# Patient Record
Sex: FEMALE | Race: AMERICAN INDIAN OR ALASKA NATIVE | NOT HISPANIC OR LATINO
[De-identification: names, ages, dates, MRNs, and addresses within clinical notes are randomized per-mention and may not be internally consistent; named-entity substitution may affect disease eponyms.]

---

## 2018-09-20 ENCOUNTER — TRANSCRIPTION ENCOUNTER (OUTPATIENT)
Age: 48
End: 2018-09-20

## 2019-01-20 ENCOUNTER — TRANSCRIPTION ENCOUNTER (OUTPATIENT)
Age: 49
End: 2019-01-20

## 2021-10-21 ENCOUNTER — TRANSCRIPTION ENCOUNTER (OUTPATIENT)
Age: 51
End: 2021-10-21

## 2023-01-01 ENCOUNTER — NON-APPOINTMENT (OUTPATIENT)
Age: 53
End: 2023-01-01

## 2023-01-02 ENCOUNTER — EMERGENCY (EMERGENCY)
Facility: HOSPITAL | Age: 53
LOS: 1 days | Discharge: HOME | End: 2023-01-02
Attending: EMERGENCY MEDICINE | Admitting: EMERGENCY MEDICINE
Payer: MEDICAID

## 2023-01-02 VITALS
HEART RATE: 98 BPM | RESPIRATION RATE: 18 BRPM | TEMPERATURE: 98 F | DIASTOLIC BLOOD PRESSURE: 80 MMHG | OXYGEN SATURATION: 97 % | SYSTOLIC BLOOD PRESSURE: 130 MMHG

## 2023-01-02 VITALS
WEIGHT: 179.9 LBS | HEART RATE: 107 BPM | DIASTOLIC BLOOD PRESSURE: 85 MMHG | TEMPERATURE: 98 F | SYSTOLIC BLOOD PRESSURE: 133 MMHG | OXYGEN SATURATION: 97 % | RESPIRATION RATE: 18 BRPM

## 2023-01-02 PROCEDURE — 73630 X-RAY EXAM OF FOOT: CPT

## 2023-01-02 PROCEDURE — 73610 X-RAY EXAM OF ANKLE: CPT | Mod: 26,RT

## 2023-01-02 PROCEDURE — 73630 X-RAY EXAM OF FOOT: CPT | Mod: 26,RT

## 2023-01-02 PROCEDURE — 99284 EMERGENCY DEPT VISIT MOD MDM: CPT | Mod: 25

## 2023-01-02 PROCEDURE — 99284 EMERGENCY DEPT VISIT MOD MDM: CPT

## 2023-01-02 PROCEDURE — 73610 X-RAY EXAM OF ANKLE: CPT

## 2023-01-02 RX ORDER — IBUPROFEN 200 MG
600 TABLET ORAL ONCE
Refills: 0 | Status: COMPLETED | OUTPATIENT
Start: 2023-01-02 | End: 2023-01-02

## 2023-01-02 RX ADMIN — Medication 600 MILLIGRAM(S): at 21:34

## 2023-01-02 NOTE — ED PROVIDER NOTE - SKIN, MLM
Skin normal color for race, warm, dry and intact. No evidence of rash. no swelling or redness to right ankle. no abrasions or lacerations. cap refill < 2 sec

## 2023-01-02 NOTE — ED PROVIDER NOTE - CARE PROVIDER_API CALL
Todd Mathur (DPM)  Foot Surgery; Podiatric Medicine; [facility] Rearfoot[facility]kle Surgery  [address] [place] [address]  Phone: [phone]  Fax: [phone]  Follow Up Time: 1-3 Days    or your podiatrist,   Phone: (   )    -  Fax: (   )    -  Follow Up Time: 1-3 Days

## 2023-01-02 NOTE — ED PROVIDER NOTE - OBJECTIVE STATEMENT
52-year-old female with no past medical history presents to the ED complaining of right foot and ankle pain status post injury this morning.  Patient explains she was walking and twisted her ankle.  No fall to the ground.  Patient explains that for the first 4 hours after the injury she was able to ambulate however now the pain is worse and is unable to bear weight.  No additional injury.  No head trauma.  Denies fever, chills, chest pain, shortness of breath, abdominal pain, nausea, vomiting, lower extremity weakness or paresthesias.

## 2023-01-02 NOTE — ED ADULT NURSE NOTE - OBJECTIVE STATEMENT
Patient A/Ox4 with clear speech. Comes in c/o right ankle pain s/p trip and fall. Denies head strike or LOC. NAD noted.

## 2023-01-02 NOTE — ED PROVIDER NOTE - NS ED ATTENDING STATEMENT MOD
This was a shared visit with the PATO. I reviewed and verified the documentation and independently performed the documented:

## 2023-01-02 NOTE — ED PROVIDER NOTE - CLINICAL SUMMARY MEDICAL DECISION MAKING FREE TEXT BOX
Patient is a 52-year-old female who presents to the emergency room with a chief complaint of right foot/ankle pain.  Patient with no significant past medical history reports that she is on no active medications at this time.  States that earlier this morning she was walking she lost her balance and twisted her right foot.  Did not fall to the ground, did not strike her head, and there is no LOC.  Initially she reports that she was able to walk and bear weight on the foot.  She was seen at urgent care earlier patient reports that she was told her x-rays are normal she was discharged home with no further treatment.  She reports since then she had worsening pain and difficulty weightbearing secondary to the pain.  Since her symptoms worsen she decided to seek treatment in the emergency room.  Denies chest pain shortness of breath abdominal pain.  Denies numbness or tingling to the extremity.  Patient reports no prior injury to the right foot or ankle.   at bedside in agreement with history. On exam she is sitting in a wheelchair does not appear to be in any acute distress.  Right foot ankle: No tenderness to palpation to the medial or lateral malleoli, Achilles reflex intact, there is swelling just distal to the ankle joint on the ulnar aspect of the foot with tenderness to palpation.  Positive radial pulse cap refill less than 2 seconds sensation grossly intact.  No ecchymosis or skin changes noted to the foot no ecchymosis noted to the plantar aspect of the foot.  Bunion is noted to the first metatarsal patient reports history of this.  Concern for possible sprain strain versus fracture.  Patient is presenting to the emergency room with a chief complaint of foot and ankle pain after an inversion injury.  Neurovascularly intact.  Will obtain x-ray imaging x-rays.  Independent interpretation of x-ray: appears to be any nuclear fracture although the chronicity is unclear given the fact the patient denies any previous history of ankle fracture must consider this acute.  Also questionable fracture of the tip of the tibia.  Will place patient in splint, provide crutches for nonweightbearing, and provide pain control with anti-inflammatories.  Patient advised that she will need to follow-up with the podiatrist outpatient.  She last saw one 1 year ago and additional referral be provided if needed.  Stable for discharge home.   at bedside.

## 2023-01-02 NOTE — ED PROVIDER NOTE - MUSCULOSKELETAL MINIMAL EXAM
+ TTP right lateral malleoous with decreased ROM due to pain. + TTP right prox 5th metatarsal. dp pulses equal and intact bilaterally.

## 2023-01-02 NOTE — ED PROVIDER NOTE - PROVIDER TOKENS
PROVIDER:[TOKEN:[[id number]],FOLLOWUP:[1-3 Days]],FREE:[LAST:[or your podiatrist],PHONE:[(   )    -],FAX:[(   )    -],FOLLOWUP:[1-3 Days]]

## 2023-01-02 NOTE — ED PROVIDER NOTE - PATIENT PORTAL LINK FT
You can access the FollowMyHealth Patient Portal offered by [facility] by registering at the following website: [email or web address]. By joining [employer]’s FollowMyHealth portal, you will also be able to view your health information using other applications (apps) compatible with our system.

## 2023-01-03 PROBLEM — Z00.00 ENCOUNTER FOR PREVENTIVE HEALTH EXAMINATION: Status: ACTIVE | Noted: 2023-01-03

## 2023-01-13 ENCOUNTER — APPOINTMENT (OUTPATIENT)
Dept: PODIATRY | Facility: CLINIC | Age: 53
End: 2023-01-13

## 2024-12-11 ENCOUNTER — EMERGENCY (EMERGENCY)
Facility: HOSPITAL | Age: 54
LOS: 1 days | Discharge: HOME | End: 2024-12-11
Attending: EMERGENCY MEDICINE | Admitting: EMERGENCY MEDICINE
Payer: COMMERCIAL

## 2024-12-11 VITALS
OXYGEN SATURATION: 98 % | DIASTOLIC BLOOD PRESSURE: 78 MMHG | HEART RATE: 87 BPM | RESPIRATION RATE: 17 BRPM | TEMPERATURE: 98 F | SYSTOLIC BLOOD PRESSURE: 125 MMHG

## 2024-12-11 VITALS
WEIGHT: 179.9 LBS | TEMPERATURE: 98 F | HEART RATE: 96 BPM | HEIGHT: 65 IN | RESPIRATION RATE: 18 BRPM | SYSTOLIC BLOOD PRESSURE: 130 MMHG | DIASTOLIC BLOOD PRESSURE: 84 MMHG | OXYGEN SATURATION: 97 %

## 2024-12-11 PROBLEM — Z78.9 OTHER SPECIFIED HEALTH STATUS: Chronic | Status: ACTIVE | Noted: 2023-01-02

## 2024-12-11 PROCEDURE — 99284 EMERGENCY DEPT VISIT MOD MDM: CPT

## 2024-12-11 PROCEDURE — 99283 EMERGENCY DEPT VISIT LOW MDM: CPT | Mod: 25

## 2024-12-11 PROCEDURE — 96372 THER/PROPH/DIAG INJ SC/IM: CPT

## 2024-12-11 RX ORDER — INDOMETHACIN 75 MG/1
1 CAPSULE, EXTENDED RELEASE ORAL
Qty: 15 | Refills: 0
Start: 2024-12-11 | End: 2024-12-15

## 2024-12-11 RX ORDER — KETOROLAC TROMETHAMINE 30 MG/ML
60 INJECTION INTRAMUSCULAR; INTRAVENOUS ONCE
Refills: 0 | Status: DISCONTINUED | OUTPATIENT
Start: 2024-12-11 | End: 2024-12-11

## 2024-12-11 RX ORDER — PREDNISONE 20 MG/1
60 TABLET ORAL ONCE
Refills: 0 | Status: COMPLETED | OUTPATIENT
Start: 2024-12-11 | End: 2024-12-11

## 2024-12-11 RX ADMIN — KETOROLAC TROMETHAMINE 60 MILLIGRAM(S): 30 INJECTION INTRAMUSCULAR; INTRAVENOUS at 13:39

## 2024-12-11 RX ADMIN — PREDNISONE 60 MILLIGRAM(S): 20 TABLET ORAL at 13:39

## 2024-12-11 RX ADMIN — KETOROLAC TROMETHAMINE 60 MILLIGRAM(S): 30 INJECTION INTRAMUSCULAR; INTRAVENOUS at 14:09

## 2024-12-11 NOTE — ED PROVIDER NOTE - PATIENT PORTAL LINK FT
You can access the FollowMyHealth Patient Portal offered by [facility] by registering at the following website: [email or web address]. By joining [employer]’s FollowMyHealth portal, you will also be able to view your health information using other applications (apps) compatible with our system.

## 2024-12-11 NOTE — ED PROVIDER NOTE - OBJECTIVE STATEMENT
53yo female with left foot pain, no trauma or fall, for the last few days, no hx of gout, pt is able to bear weight has been taking tylenol with no relief.

## 2024-12-11 NOTE — ED ADULT NURSE NOTE - HISTORY OF COVID-19 VACCINATION
Detail Level: Detailed Quality 226: Preventive Care And Screening: Tobacco Use: Screening And Cessation Intervention: Patient screened for tobacco use and is an ex/non-smoker Vaccine status unknown

## 2024-12-11 NOTE — ED ADULT TRIAGE NOTE - GLASGOW COMA SCALE: EYE OPENING, MLM
Order sent! Thanks! 
Robbin Carreongarrett left a voicemail on 9/27 after we left for the day and said that Kaden needs a new prescription to fill for the marta 3 plus. Patient was wanting this to be done over the weekend but with no one working weekends the message wasn't received until this morning. Please send new order as soon as you are able to or call pt if you have any questions.     Thank you   Lissy Dumas - [facility]    [phone]    
(E4) spontaneous

## 2024-12-11 NOTE — ED ADULT NURSE NOTE - OBJECTIVE STATEMENT
pt comes in for swelling of left plantar instep. pt states there was no injury or abnormal activity. pain started yesterday and was worse this AM. area appears swollen and bruised. positive pedal pulses

## 2024-12-11 NOTE — ED ADULT NURSE NOTE - NSFALLRISKINTERV_ED_ALL_ED
Assistance OOB with selected safe patient handling equipment if applicable/Assistance with ambulation/Communicate fall risk and risk factors to all staff, patient, and family/Monitor gait and stability/Provide visual cue: yellow wristband, yellow gown, etc/Reinforce activity limits and safety measures with patient and family/Call bell, personal items and telephone in reach/Instruct patient to call for assistance before getting out of bed/chair/stretcher/Non-slip footwear applied when patient is off stretcher/[place] to call system/Physically safe environment - no spills, clutter or unnecessary equipment/Purposeful Proactive Rounding/Room/bathroom lighting operational, light cord in reach

## 2024-12-14 ENCOUNTER — EMERGENCY (EMERGENCY)
Facility: HOSPITAL | Age: 54
LOS: 1 days | Discharge: HOME | End: 2024-12-14
Attending: EMERGENCY MEDICINE | Admitting: EMERGENCY MEDICINE
Payer: COMMERCIAL

## 2024-12-14 VITALS
HEIGHT: 65 IN | SYSTOLIC BLOOD PRESSURE: 134 MMHG | OXYGEN SATURATION: 98 % | WEIGHT: 169.98 LBS | DIASTOLIC BLOOD PRESSURE: 81 MMHG | RESPIRATION RATE: 16 BRPM | TEMPERATURE: 98 F | HEART RATE: 92 BPM

## 2024-12-14 VITALS
SYSTOLIC BLOOD PRESSURE: 130 MMHG | RESPIRATION RATE: 18 BRPM | DIASTOLIC BLOOD PRESSURE: 80 MMHG | OXYGEN SATURATION: 98 % | TEMPERATURE: 98 F | HEART RATE: 85 BPM

## 2024-12-14 PROCEDURE — 99283 EMERGENCY DEPT VISIT LOW MDM: CPT

## 2024-12-14 RX ORDER — PREDNISONE 20 MG/1
2 TABLET ORAL
Qty: 10 | Refills: 0
Start: 2024-12-14 | End: 2024-12-18

## 2024-12-14 NOTE — ED PROVIDER NOTE - OBJECTIVE STATEMENT
Patient is a 54-year-old female who presents to the emergency department here at [facility] complaining of left foot pain.  Patient was seen here 2 days ago complaining of atraumatic left foot pain with no history of gout able to weight-bear but pain worsened with weightbearing.  No fever no chills no nausea no vomiting no diarrhea no trauma.  Patient was evaluated at that time was given 1 oral prednisone as well as IM ketorolac 60 mg with some relief of pain but as result of continued discomfort decided to present here today for further evaluation care treatment and management.

## 2024-12-14 NOTE — ED ADULT NURSE NOTE - OBJECTIVE STATEMENT
pt states that she was seen in the ED and was diagnosed with gout. pt reports N/V and a headache for the past three days. pt in no acute distress. pt updated on plan of care.

## 2024-12-14 NOTE — ED ADULT NURSE NOTE - NS ED NURSE RECORD ANOTHER HT AND WT
Detail Level: Zone
Initiate Treatment: Triamcinolone cream AAA BID PRN x 2 weeks, RTC if not clear with topical
Yes

## 2024-12-14 NOTE — ED PROVIDER NOTE - PHYSICAL EXAMINATION
Examination reveals a well-developed well-nourished female in no acute distress with a left foot exam that reveals mild swelling medial aspect left foot near base of first metatarsal area with slight erythema and warmth.  No lymphangitis.  Neurovascular intact right lower extremity.

## 2024-12-14 NOTE — ED ADULT TRIAGE NOTE - CHIEF COMPLAINT QUOTE
54y F  from home to ED triage.. pt c/o worsening pain to medial LT foot (dx with gout in ER past week- taking indomethacin 50mg), and HA with N/V starting this AM

## 2024-12-14 NOTE — ED PROVIDER NOTE - PATIENT PORTAL LINK FT
You can access the FollowMyHealth Patient Portal offered by [facility] by registering at the following website: [email or web address]. By joining [employer]’s FollowMyHealth portal, you will also be able to view your health information using other applications (apps) compatible with our system.

## 2024-12-14 NOTE — ED PROVIDER NOTE - CLINICAL SUMMARY MEDICAL DECISION MAKING FREE TEXT BOX
Left foot pain atraumatic consistent with gouty arthropathy requiring thorough evaluation performed in an independent manner along with medications.

## 2024-12-14 NOTE — ED PROVIDER NOTE - NSFOLLOWUPINSTRUCTIONS_ED_ALL_ED_FT
Rest.  Elevate leg when resting at home.  Continue to take indomethacin.  Take prednisone 40 mg/day for the next 5 days.  Follow-up with your primary care physician this week for reevaluation.  Return here if needed.

## 2024-12-14 NOTE — ED PROVIDER NOTE - DIFFERENTIAL DIAGNOSIS
Differential Diagnosis Musculoskeletal pain versus soft tissue trauma versus gout versus sprain strain doubt doubt septic arthritis.

## 2024-12-14 NOTE — ED PROVIDER NOTE - CARE PROVIDER_API CALL
Daniel Parks.  Rheumatology  [address] [place] [address]  Phone: [phone]  Fax: [phone]  Follow Up Time:

## 2024-12-17 ENCOUNTER — INPATIENT (INPATIENT)
Facility: HOSPITAL | Age: 54
LOS: 9 days | Discharge: HOME | DRG: 854 | End: 2024-12-27
Attending: STUDENT IN AN ORGANIZED HEALTH CARE EDUCATION/TRAINING PROGRAM | Admitting: INTERNAL MEDICINE
Payer: COMMERCIAL

## 2024-12-17 VITALS
TEMPERATURE: 98 F | DIASTOLIC BLOOD PRESSURE: 85 MMHG | SYSTOLIC BLOOD PRESSURE: 148 MMHG | WEIGHT: 177.91 LBS | HEART RATE: 110 BPM | OXYGEN SATURATION: 98 % | HEIGHT: 65 IN | RESPIRATION RATE: 18 BRPM

## 2024-12-17 DIAGNOSIS — L03.116 CELLULITIS OF LEFT LOWER LIMB: ICD-10-CM

## 2024-12-17 LAB
ALBUMIN SERPL ELPH-MCNC: 3.6 G/DL — SIGNIFICANT CHANGE UP (ref 3.3–5)
ALP SERPL-CCNC: 88 U/L — SIGNIFICANT CHANGE UP (ref 40–120)
ALT FLD-CCNC: 36 U/L — SIGNIFICANT CHANGE UP (ref 12–78)
ANION GAP SERPL CALC-SCNC: 7 MMOL/L — SIGNIFICANT CHANGE UP (ref 5–17)
APTT BLD: 32.8 SEC — SIGNIFICANT CHANGE UP (ref 24.5–35.6)
AST SERPL-CCNC: 15 U/L — SIGNIFICANT CHANGE UP (ref 15–37)
BASOPHILS # BLD AUTO: 0.09 K/UL — SIGNIFICANT CHANGE UP (ref 0–0.2)
BASOPHILS NFR BLD AUTO: 0.4 % — SIGNIFICANT CHANGE UP (ref 0–2)
BILIRUB SERPL-MCNC: 0.4 MG/DL — SIGNIFICANT CHANGE UP (ref 0.2–1.2)
BUN SERPL-MCNC: 14 MG/DL — SIGNIFICANT CHANGE UP (ref 7–23)
CALCIUM SERPL-MCNC: 9.8 MG/DL — SIGNIFICANT CHANGE UP (ref 8.5–10.1)
CHLORIDE SERPL-SCNC: 104 MMOL/L — SIGNIFICANT CHANGE UP (ref 96–108)
CO2 SERPL-SCNC: 26 MMOL/L — SIGNIFICANT CHANGE UP (ref 22–31)
CREAT SERPL-MCNC: 0.94 MG/DL — SIGNIFICANT CHANGE UP (ref 0.5–1.3)
EGFR: 73 ML/MIN/1.73M2 — SIGNIFICANT CHANGE UP
EOSINOPHIL # BLD AUTO: 0.01 K/UL — SIGNIFICANT CHANGE UP (ref 0–0.5)
EOSINOPHIL NFR BLD AUTO: 0 % — SIGNIFICANT CHANGE UP (ref 0–6)
ERYTHROCYTE [SEDIMENTATION RATE] IN BLOOD: 53 MM/HR — HIGH (ref 0–20)
GLUCOSE SERPL-MCNC: 140 MG/DL — HIGH (ref 70–99)
HCG SERPL-ACNC: <1 MIU/ML — SIGNIFICANT CHANGE UP
HCT VFR BLD CALC: 39.3 % — SIGNIFICANT CHANGE UP (ref 34.5–45)
HGB BLD-MCNC: 13.2 G/DL — SIGNIFICANT CHANGE UP (ref 11.5–15.5)
IMM GRANULOCYTES NFR BLD AUTO: 0.4 % — SIGNIFICANT CHANGE UP (ref 0–0.9)
INR BLD: 1.06 RATIO — SIGNIFICANT CHANGE UP (ref 0.85–1.16)
LACTATE SERPL-SCNC: 2.6 MMOL/L — HIGH (ref 0.7–2)
LYMPHOCYTES # BLD AUTO: 10.5 % — LOW (ref 13–44)
LYMPHOCYTES # BLD AUTO: 2.13 K/UL — SIGNIFICANT CHANGE UP (ref 1–3.3)
MCHC RBC-ENTMCNC: 29.4 PG — SIGNIFICANT CHANGE UP (ref 27–34)
MCHC RBC-ENTMCNC: 33.6 G/DL — SIGNIFICANT CHANGE UP (ref 32–36)
MCV RBC AUTO: 87.5 FL — SIGNIFICANT CHANGE UP (ref 80–100)
MONOCYTES # BLD AUTO: 1.95 K/UL — HIGH (ref 0–0.9)
MONOCYTES NFR BLD AUTO: 9.6 % — SIGNIFICANT CHANGE UP (ref 2–14)
NEUTROPHILS # BLD AUTO: 15.96 K/UL — HIGH (ref 1.8–7.4)
NEUTROPHILS NFR BLD AUTO: 79.1 % — HIGH (ref 43–77)
NRBC # BLD: 0 /100 WBCS — SIGNIFICANT CHANGE UP (ref 0–0)
PLATELET # BLD AUTO: 338 K/UL — SIGNIFICANT CHANGE UP (ref 150–400)
POTASSIUM SERPL-MCNC: 3.4 MMOL/L — LOW (ref 3.5–5.3)
POTASSIUM SERPL-SCNC: 3.4 MMOL/L — LOW (ref 3.5–5.3)
PROT SERPL-MCNC: 7.8 G/DL — SIGNIFICANT CHANGE UP (ref 6–8.3)
PROTHROM AB SERPL-ACNC: 12.4 SEC — SIGNIFICANT CHANGE UP (ref 9.9–13.4)
RBC # BLD: 4.49 M/UL — SIGNIFICANT CHANGE UP (ref 3.8–5.2)
RBC # FLD: 13.7 % — SIGNIFICANT CHANGE UP (ref 10.3–14.5)
SODIUM SERPL-SCNC: 137 MMOL/L — SIGNIFICANT CHANGE UP (ref 135–145)
WBC # BLD: 20.23 K/UL — HIGH (ref 3.8–10.5)
WBC # FLD AUTO: 20.23 K/UL — HIGH (ref 3.8–10.5)

## 2024-12-17 PROCEDURE — 71045 X-RAY EXAM CHEST 1 VIEW: CPT | Mod: 26

## 2024-12-17 PROCEDURE — 73720 MRI LWR EXTREMITY W/O&W/DYE: CPT | Mod: 26,LT,MC

## 2024-12-17 PROCEDURE — 73630 X-RAY EXAM OF FOOT: CPT | Mod: 26,LT

## 2024-12-17 PROCEDURE — 93010 ELECTROCARDIOGRAM REPORT: CPT

## 2024-12-17 PROCEDURE — 99285 EMERGENCY DEPT VISIT HI MDM: CPT

## 2024-12-17 RX ORDER — KETOROLAC TROMETHAMINE 30 MG/ML
30 INJECTION INTRAMUSCULAR; INTRAVENOUS ONCE
Refills: 0 | Status: DISCONTINUED | OUTPATIENT
Start: 2024-12-17 | End: 2024-12-17

## 2024-12-17 RX ORDER — PIPERACILLIN AND TAZOBACTAM 3; .375 G/15ML; G/15ML
3.38 INJECTION, POWDER, LYOPHILIZED, FOR SOLUTION INTRAVENOUS ONCE
Refills: 0 | Status: COMPLETED | OUTPATIENT
Start: 2024-12-17 | End: 2024-12-17

## 2024-12-17 RX ORDER — SODIUM CHLORIDE 9 MG/ML
1500 INJECTION, SOLUTION INTRAMUSCULAR; INTRAVENOUS; SUBCUTANEOUS ONCE
Refills: 0 | Status: COMPLETED | OUTPATIENT
Start: 2024-12-17 | End: 2024-12-17

## 2024-12-17 RX ORDER — ACETAMINOPHEN 80 MG/.8ML
650 SOLUTION/ DROPS ORAL EVERY 6 HOURS
Refills: 0 | Status: DISCONTINUED | OUTPATIENT
Start: 2024-12-17 | End: 2024-12-17

## 2024-12-17 RX ORDER — ACETAMINOPHEN 80 MG/.8ML
1000 SOLUTION/ DROPS ORAL ONCE
Refills: 0 | Status: COMPLETED | OUTPATIENT
Start: 2024-12-17 | End: 2024-12-17

## 2024-12-17 RX ORDER — SODIUM CHLORIDE 9 MG/ML
2500 INJECTION, SOLUTION INTRAMUSCULAR; INTRAVENOUS; SUBCUTANEOUS ONCE
Refills: 0 | Status: DISCONTINUED | OUTPATIENT
Start: 2024-12-17 | End: 2024-12-17

## 2024-12-17 RX ORDER — ACETAMINOPHEN 80 MG/.8ML
650 SOLUTION/ DROPS ORAL EVERY 6 HOURS
Refills: 0 | Status: DISCONTINUED | OUTPATIENT
Start: 2024-12-17 | End: 2024-12-24

## 2024-12-17 RX ORDER — MORPHINE SULFATE 15 MG
2 TABLET, EXTENDED RELEASE ORAL EVERY 4 HOURS
Refills: 0 | Status: DISCONTINUED | OUTPATIENT
Start: 2024-12-17 | End: 2024-12-22

## 2024-12-17 RX ORDER — VANCOMYCIN HYDROCHLORIDE 5 G/100ML
750 INJECTION, POWDER, LYOPHILIZED, FOR SOLUTION INTRAVENOUS ONCE
Refills: 0 | Status: COMPLETED | OUTPATIENT
Start: 2024-12-17 | End: 2024-12-17

## 2024-12-17 RX ORDER — SODIUM CHLORIDE 9 MG/ML
1000 INJECTION, SOLUTION INTRAMUSCULAR; INTRAVENOUS; SUBCUTANEOUS ONCE
Refills: 0 | Status: COMPLETED | OUTPATIENT
Start: 2024-12-17 | End: 2024-12-17

## 2024-12-17 RX ADMIN — PIPERACILLIN AND TAZOBACTAM 200 GRAM(S): 3; .375 INJECTION, POWDER, LYOPHILIZED, FOR SOLUTION INTRAVENOUS at 17:40

## 2024-12-17 RX ADMIN — KETOROLAC TROMETHAMINE 30 MILLIGRAM(S): 30 INJECTION INTRAMUSCULAR; INTRAVENOUS at 23:48

## 2024-12-17 RX ADMIN — PIPERACILLIN AND TAZOBACTAM 25 GRAM(S): 3; .375 INJECTION, POWDER, LYOPHILIZED, FOR SOLUTION INTRAVENOUS at 23:49

## 2024-12-17 RX ADMIN — ACETAMINOPHEN 400 MILLIGRAM(S): 80 SOLUTION/ DROPS ORAL at 16:23

## 2024-12-17 RX ADMIN — PIPERACILLIN AND TAZOBACTAM 3.38 GRAM(S): 3; .375 INJECTION, POWDER, LYOPHILIZED, FOR SOLUTION INTRAVENOUS at 18:37

## 2024-12-17 RX ADMIN — SODIUM CHLORIDE 1000 MILLILITER(S): 9 INJECTION, SOLUTION INTRAMUSCULAR; INTRAVENOUS; SUBCUTANEOUS at 19:00

## 2024-12-17 RX ADMIN — SODIUM CHLORIDE 1500 MILLILITER(S): 9 INJECTION, SOLUTION INTRAMUSCULAR; INTRAVENOUS; SUBCUTANEOUS at 17:40

## 2024-12-17 RX ADMIN — Medication 2 MILLIGRAM(S): at 21:37

## 2024-12-17 RX ADMIN — ACETAMINOPHEN 1000 MILLIGRAM(S): 80 SOLUTION/ DROPS ORAL at 16:38

## 2024-12-17 RX ADMIN — SODIUM CHLORIDE 1000 MILLILITER(S): 9 INJECTION, SOLUTION INTRAMUSCULAR; INTRAVENOUS; SUBCUTANEOUS at 16:23

## 2024-12-17 RX ADMIN — ACETAMINOPHEN 1000 MILLIGRAM(S): 80 SOLUTION/ DROPS ORAL at 17:00

## 2024-12-17 RX ADMIN — VANCOMYCIN HYDROCHLORIDE 250 MILLIGRAM(S): 5 INJECTION, POWDER, LYOPHILIZED, FOR SOLUTION INTRAVENOUS at 20:00

## 2024-12-17 NOTE — ED PROVIDER NOTE - CLINICAL SUMMARY MEDICAL DECISION MAKING FREE TEXT BOX
54-year-old female with no significant past medical history complains of left foot redness and swelling over the past 1 week.  No known trauma.  Patient was seen initially, and started on prednisone for presumed gout.  However the patient's had persistent pain, was seen by rheumatology and podiatry, found to have an elevated white blood cell count and was sent to the ER for admission.  No acute trauma.  No numbness or tingling.  No focal weakness.  No aggravating or alleviating factors otherwise noted.  No other acute complaints.  Exam: Nontoxic, well-appearing.  Normal respiratory effort.  CTA BL, no W/R/R.  Normal cardiac exam.  Left foot with moderate swelling, mild erythema.  Increased pain with full range of motion of the ankle no crepitus.  Normal distal strength and sensation equal bilaterally.  2+ pulses.  Normal cap refill.  Acute left foot/ankle , redness and swelling, possible infection. will check labs, IV antibiotics, admission

## 2024-12-17 NOTE — ED ADULT TRIAGE NOTE - CHIEF COMPLAINT QUOTE
pt was seen here and dx with gout. pt returns today from hematologist with concerns of infection to left fot. pt reports blood work showed infection ( not sure which values) denies fever.

## 2024-12-17 NOTE — ED PROVIDER NOTE - PROGRESS NOTE DETAILS
Pt seen by podiatrist, Dr. Fatima in ED who recommended admission for IV antibiotics and MRI. Spoke to Dr. ANTONINO Ontiveros, who accepted admission.

## 2024-12-17 NOTE — ED ADULT NURSE NOTE - NSFALLRISKINTERV_ED_ALL_ED
Assistance OOB with selected safe patient handling equipment if applicable/Assistance with ambulation/Communicate fall risk and risk factors to all staff, patient, and family/Monitor gait and stability/Provide visual cue: yellow wristband, yellow gown, etc/Reinforce activity limits and safety measures with patient and family/Call bell, personal items and telephone in reach/Instruct patient to call for assistance before getting out of bed/chair/stretcher/Non-slip footwear applied when patient is off stretcher/[place] to call system/Physically safe environment - no spills, clutter or unnecessary equipment/Purposeful Proactive Rounding/Room/bathroom lighting operational, light cord in reach

## 2024-12-17 NOTE — CONSULT NOTE ADULT - PROBLEM SELECTOR RECOMMENDATION 9
Discussed diagnosis and treatment with patient that patient and her family agrees .   There is concern patient has cellulitis/injection rule out gout, tendinitis  Applied compression dressing on lower left leg   Recs ID consult and IV Abx  WBC 20k, continue to trend  Recs MRI left foot for further investigation  Weight bearing as tolerated on left foot   Recs elevation on left lower extremity   Recs admission for further management.   Medical management as per Primary Team   Pt understands that surgical intervention maybe required at a later date if symptoms not remit.    Thank you for this consult   Discussed with all attendings. Discussed diagnosis and treatment with patient that patient and her family agrees .   There is concern patient has cellulitis/infection rule out gout, tendinitis  Applied compression dressing on lower left leg   Recs ID consult and IV Abx  WBC 20k, continue to trend  Recs MRI left foot for further investigation  Weight bearing as tolerated on left foot   Recs elevation on left lower extremity   Recs admission for further management.   Medical management as per Primary Team   Pt understands that surgical intervention maybe required at a later date if symptoms not remit.    Thank you for this consult   Discussed with all attendings.

## 2024-12-17 NOTE — H&P ADULT - HISTORY OF PRESENT ILLNESS
54-year-old female who presents to the emergency department here at [facility] complaining of left foot pain.  Patient was seen here 3 days ago complaining of atraumatic left foot pain with no history of gout able to weight-bear but pain worsened with weightbearing.  No fever no chills no nausea no vomiting no diarrhea no trauma

## 2024-12-17 NOTE — ED ADULT NURSE REASSESSMENT NOTE - TEMPERATURE IN CELSIUS (DEGREES C)
Date: 7/7/2024  Start Time: 0950  End Time:  1025  Number of Participants: 14    Type of Group: Psychoeducation    Wellness Binder Information  Module Name:  Supporting someone with depression    Patient's Goal:  Patient will be able to ID one or two ways to improve how to support someone with a mental health diagnosis.  Patient will exhibit improved knowledge of what types of support is needed.      Notes:  CTRS discussed ways to improve support with someone with a mental health diagnosis.  Patient was an active participant in discussion and was receptive to the information provided.  Patient was accepting of handout.      Status After Intervention:  Improved    Participation Level: Active Listener and Interactive    Participation Quality: Appropriate and Attentive      Speech:  normal      Thought Process/Content: Logical      Affective Functioning: Congruent      Mood: anxious      Level of consciousness:  Alert and Attentive      Response to Learning: Able to verbalize current knowledge/experience, Able to verbalize/acknowledge new learning, and Progressing to goal      Endings: None Reported    Modes of Intervention: Education, Support, Exploration, and Clarifying      Discipline Responsible: Recreational Therapist      Signature:  MIGUEL ANGEL Harding     37.1

## 2024-12-17 NOTE — ED PROVIDER NOTE - OBJECTIVE STATEMENT
54-year-old female with no past medical history presents with complaint of foot pain/redness x 1 week and elevated white blood cell count.  Patient states that she was seen in ED on 12/11 and 12/14 for atraumatic left foot pain, was told to have gout and was initially treated with 1 dose of prednisone, IM pain medication in ED and discharged home on indomethacin, seen in the ED again 3 days later and was given 3 days of prednisone without improvement.  States that she was seen by rheumatologist, Dr. Franks yesterday and had outpatient blood work and was told to have WBC of 21, given colchicine without improvement and advised to come to ER for IV antibiotics.  Patient denies trauma, numbness, tingling, fever, chills, nausea/vomiting, calf pain.

## 2024-12-17 NOTE — H&P ADULT - NSHPPHYSICALEXAM_GEN_ALL_CORE
General: WN/WD NAD  PERRLA  Neurology: A&Ox3, nonfocal, GARRETT x 4  Respiratory: CTA B/L  CV: RRR, S1S2, no murmurs, rubs or gallops  Abdominal: Soft, NT, ND +BS, Last BM  Extremities: edema and redness Left foot

## 2024-12-17 NOTE — H&P ADULT - ASSESSMENT
54-year-old female who presents to the emergency department here at [facility] complaining of left foot pain.  Patient was seen here 3 days ago complaining of atraumatic left foot pain with no history of gout able to weight-bear but pain worsened with weightbearing.  No fever no chills no nausea no vomiting no diarrhea no trauma 54-year-old female who presents to the emergency department here at [facility] complaining of left foot pain.  Patient was seen here 3 days ago complaining of atraumatic left foot pain with no history of gout able to weight-bear but pain worsened with weightbearing.  No fever no chills no nausea no vomiting no diarrhea no trauma      Left foot pain  -  likely cellulitis vs OM  - cw abx  - podiatry fu   - check MRI  - pain control  - fu cultures  - will monitor       Lovenox for DVT prophylaxis

## 2024-12-17 NOTE — ED PROVIDER NOTE - MUSCULOSKELETAL, MLM
+ttp with erythema, swelling and increased warmth noted to medial aspect of proximal left foot/ankle, skin intact, no streaking noted, distal pulses and sensation intact, nl cap refill, NVI

## 2024-12-17 NOTE — H&P ADULT - NSHPLABSRESULTS_GEN_ALL_CORE
Lab Results:  CBC  CBC Full  -  ( 17 Dec 2024 16:10 )  WBC Count : 20.23 K/uL  RBC Count : 4.49 M/uL  Hemoglobin : 13.2 g/dL  Hematocrit : 39.3 %  Platelet Count - Automated : 338 K/uL  Mean Cell Volume : 87.5 fl  Mean Cell Hemoglobin : 29.4 pg  Mean Cell Hemoglobin Concentration : 33.6 g/dL  Auto Neutrophil # : 15.96 K/uL  Auto Lymphocyte # : 2.13 K/uL  Auto Monocyte # : 1.95 K/uL  Auto Eosinophil # : 0.01 K/uL  Auto Basophil # : 0.09 K/uL  Auto Neutrophil % : 79.1 %  Auto Lymphocyte % : 10.5 %  Auto Monocyte % : 9.6 %  Auto Eosinophil % : 0.0 %  Auto Basophil % : 0.4 %    .		Differential:	[] Automated		[] Manual  Chemistry                        13.2   20.23 )-----------( 338      ( 17 Dec 2024 16:10 )             39.3     12-17    137  |  104  |  14  ----------------------------<  140[H]  3.4[L]   |  26  |  0.94    Ca    9.8      17 Dec 2024 16:10    TPro  7.8  /  Alb  3.6  /  TBili  0.4  /  DBili  x   /  AST  15  /  ALT  36  /  AlkPhos  88  12-17    LIVER FUNCTIONS - ( 17 Dec 2024 16:10 )  Alb: 3.6 g/dL / Pro: 7.8 g/dL / ALK PHOS: 88 U/L / ALT: 36 U/L / AST: 15 U/L / GGT: x           PT/INR - ( 17 Dec 2024 16:10 )   PT: 12.4 sec;   INR: 1.06 ratio         PTT - ( 17 Dec 2024 16:10 )  PTT:32.8 sec  Urinalysis Basic - ( 17 Dec 2024 16:10 )    Color: x / Appearance: x / SG: x / pH: x  Gluc: 140 mg/dL / Ketone: x  / Bili: x / Urobili: x   Blood: x / Protein: x / Nitrite: x   Leuk Esterase: x / RBC: x / WBC x   Sq Epi: x / Non Sq Epi: x / Bacteria: x            MICROBIOLOGY/CULTURES:      RADIOLOGY RESULTS: reviewed

## 2024-12-17 NOTE — ED ADULT NURSE NOTE - OBJECTIVE STATEMENT
Pt. received alert and oriented x4 with chief complaint of left foot/ankle pain for last week. Pt. presents w/ edema to affected area. Pt. denies any fever/body aches or chills.

## 2024-12-18 LAB
ANION GAP SERPL CALC-SCNC: 8 MMOL/L — SIGNIFICANT CHANGE UP (ref 5–17)
BUN SERPL-MCNC: 9 MG/DL — SIGNIFICANT CHANGE UP (ref 7–23)
CALCIUM SERPL-MCNC: 9.2 MG/DL — SIGNIFICANT CHANGE UP (ref 8.5–10.1)
CHLORIDE SERPL-SCNC: 104 MMOL/L — SIGNIFICANT CHANGE UP (ref 96–108)
CO2 SERPL-SCNC: 25 MMOL/L — SIGNIFICANT CHANGE UP (ref 22–31)
CREAT SERPL-MCNC: 0.63 MG/DL — SIGNIFICANT CHANGE UP (ref 0.5–1.3)
CRP SERPL-MCNC: 116 MG/L — HIGH
EGFR: 107 ML/MIN/1.73M2 — SIGNIFICANT CHANGE UP
GLUCOSE SERPL-MCNC: 127 MG/DL — HIGH (ref 70–99)
GRAM STN FLD: ABNORMAL
HCT VFR BLD CALC: 36.3 % — SIGNIFICANT CHANGE UP (ref 34.5–45)
HGB BLD-MCNC: 12.1 G/DL — SIGNIFICANT CHANGE UP (ref 11.5–15.5)
LACTATE SERPL-SCNC: 1.3 MMOL/L — SIGNIFICANT CHANGE UP (ref 0.7–2)
LACTATE SERPL-SCNC: 2.3 MMOL/L — HIGH (ref 0.7–2)
MCHC RBC-ENTMCNC: 28.8 PG — SIGNIFICANT CHANGE UP (ref 27–34)
MCHC RBC-ENTMCNC: 33.3 G/DL — SIGNIFICANT CHANGE UP (ref 32–36)
MCV RBC AUTO: 86.4 FL — SIGNIFICANT CHANGE UP (ref 80–100)
METHOD TYPE: SIGNIFICANT CHANGE UP
MSSA DNA SPEC QL NAA+PROBE: SIGNIFICANT CHANGE UP
NRBC # BLD: 0 /100 WBCS — SIGNIFICANT CHANGE UP (ref 0–0)
PLATELET # BLD AUTO: 298 K/UL — SIGNIFICANT CHANGE UP (ref 150–400)
POTASSIUM SERPL-MCNC: 3.6 MMOL/L — SIGNIFICANT CHANGE UP (ref 3.5–5.3)
POTASSIUM SERPL-SCNC: 3.6 MMOL/L — SIGNIFICANT CHANGE UP (ref 3.5–5.3)
RBC # BLD: 4.2 M/UL — SIGNIFICANT CHANGE UP (ref 3.8–5.2)
RBC # FLD: 13.5 % — SIGNIFICANT CHANGE UP (ref 10.3–14.5)
SODIUM SERPL-SCNC: 137 MMOL/L — SIGNIFICANT CHANGE UP (ref 135–145)
SPECIMEN SOURCE: SIGNIFICANT CHANGE UP
SPECIMEN SOURCE: SIGNIFICANT CHANGE UP
URATE SERPL-MCNC: 6.1 MG/DL — SIGNIFICANT CHANGE UP (ref 2.5–7)
WBC # BLD: 19.75 K/UL — HIGH (ref 3.8–10.5)
WBC # FLD AUTO: 19.75 K/UL — HIGH (ref 3.8–10.5)

## 2024-12-18 RX ORDER — INFLUENZA A VIRUS A/WISCONSIN/588/2019 (H1N1) RECOMBINANT HEMAGGLUTININ ANTIGEN, INFLUENZA A VIRUS A/DARWIN/6/2021 (H3N2) RECOMBINANT HEMAGGLUTININ ANTIGEN, INFLUENZA B VIRUS B/AUSTRIA/1359417/2021 RECOMBINANT HEMAGGLUTININ ANTIGEN, AND INFLUENZA B VIRUS B/PHUKET/3073/2013 RECOMBINANT HEMAGGLUTININ ANTIGEN 45; 45; 45; 45 UG/.5ML; UG/.5ML; UG/.5ML; UG/.5ML
0.5 INJECTION INTRAMUSCULAR ONCE
Refills: 0 | Status: DISCONTINUED | OUTPATIENT
Start: 2024-12-18 | End: 2024-12-27

## 2024-12-18 RX ORDER — ACETAMINOPHEN 80 MG/.8ML
1000 SOLUTION/ DROPS ORAL ONCE
Refills: 0 | Status: COMPLETED | OUTPATIENT
Start: 2024-12-18 | End: 2024-12-18

## 2024-12-18 RX ORDER — PIPERACILLIN AND TAZOBACTAM 3; .375 G/15ML; G/15ML
3.38 INJECTION, POWDER, LYOPHILIZED, FOR SOLUTION INTRAVENOUS EVERY 8 HOURS
Refills: 0 | Status: DISCONTINUED | OUTPATIENT
Start: 2024-12-18 | End: 2024-12-18

## 2024-12-18 RX ORDER — PIPERACILLIN AND TAZOBACTAM 3; .375 G/15ML; G/15ML
3.38 INJECTION, POWDER, LYOPHILIZED, FOR SOLUTION INTRAVENOUS ONCE
Refills: 0 | Status: COMPLETED | OUTPATIENT
Start: 2024-12-18 | End: 2024-12-18

## 2024-12-18 RX ORDER — VANCOMYCIN HYDROCHLORIDE 5 G/100ML
1250 INJECTION, POWDER, LYOPHILIZED, FOR SOLUTION INTRAVENOUS ONCE
Refills: 0 | Status: COMPLETED | OUTPATIENT
Start: 2024-12-18 | End: 2024-12-18

## 2024-12-18 RX ORDER — SODIUM CHLORIDE 9 MG/ML
1000 INJECTION, SOLUTION INTRAMUSCULAR; INTRAVENOUS; SUBCUTANEOUS
Refills: 0 | Status: DISCONTINUED | OUTPATIENT
Start: 2024-12-18 | End: 2024-12-27

## 2024-12-18 RX ORDER — PIPERACILLIN AND TAZOBACTAM 3; .375 G/15ML; G/15ML
3.38 INJECTION, POWDER, LYOPHILIZED, FOR SOLUTION INTRAVENOUS ONCE
Refills: 0 | Status: DISCONTINUED | OUTPATIENT
Start: 2024-12-18 | End: 2024-12-18

## 2024-12-18 RX ORDER — VANCOMYCIN HYDROCHLORIDE 5 G/100ML
1200 INJECTION, POWDER, LYOPHILIZED, FOR SOLUTION INTRAVENOUS ONCE
Refills: 0 | Status: DISCONTINUED | OUTPATIENT
Start: 2024-12-18 | End: 2024-12-18

## 2024-12-18 RX ADMIN — VANCOMYCIN HYDROCHLORIDE 166.67 MILLIGRAM(S): 5 INJECTION, POWDER, LYOPHILIZED, FOR SOLUTION INTRAVENOUS at 22:25

## 2024-12-18 RX ADMIN — ACETAMINOPHEN 650 MILLIGRAM(S): 80 SOLUTION/ DROPS ORAL at 11:26

## 2024-12-18 RX ADMIN — ACETAMINOPHEN 650 MILLIGRAM(S): 80 SOLUTION/ DROPS ORAL at 18:08

## 2024-12-18 RX ADMIN — PIPERACILLIN AND TAZOBACTAM 200 GRAM(S): 3; .375 INJECTION, POWDER, LYOPHILIZED, FOR SOLUTION INTRAVENOUS at 09:39

## 2024-12-18 RX ADMIN — Medication 100 MILLIGRAM(S): at 11:21

## 2024-12-18 RX ADMIN — ACETAMINOPHEN 400 MILLIGRAM(S): 80 SOLUTION/ DROPS ORAL at 05:38

## 2024-12-18 RX ADMIN — SODIUM CHLORIDE 500 MILLILITER(S): 9 INJECTION, SOLUTION INTRAMUSCULAR; INTRAVENOUS; SUBCUTANEOUS at 14:38

## 2024-12-18 RX ADMIN — Medication 100 MILLIGRAM(S): at 21:38

## 2024-12-18 NOTE — CARE COORDINATION ASSESSMENT. - NSCAREPROVIDERS_GEN_ALL_CORE_FT
CARE PROVIDERS:  Accepting Physician: Luis Ontiveros  Administration: Kalyan Balderas  Administration: Humberto Lott  Admitting: Luis Ontiveros  Attending: Luis Ontiveros  Consultant: Goyo Reinoso  Consultant: Herbert Fatima  Consultant: Joel Richardson  Consultant: Rui Lazo  Consultant: Wenceslao Gil  Covering Team: Dale Hernandes  ED ACP: Arleen Galvan  ED Attending: Momo Gonzalez  ED Nurse: Humberto Farrell  Emergency Medicine: Arleen Galvan  Nurse: Cristy Quinteros  Nurse: Kimberley Glaser  Nurse: Marjorie Jean Baptiste  Ordered: ServiceAccount, SCMMLM  Ordered: ServiceAccount, SCMMLM  Override: Misti Lucero  PCA/Nursing Assistant: Alysha Stinson  Primary Team: Mariana Stanley  Primary Team: Luis Ontiveros  Registered Dietitian: Geneva Burden

## 2024-12-18 NOTE — CONSULT NOTE ADULT - SUBJECTIVE AND OBJECTIVE BOX
Patient is a 54y old  Female who presents with a chief complaint of left foot pain    HPI:   Patient is a 54-year-old female who presents to the emergency department here at [facility] complaining of left foot pain.  Patient was seen here 3 days ago complaining of atraumatic left foot pain with no history of gout able to weight-bear but pain worsened with weightbearing.  No fever no chills no nausea no vomiting no diarrhea no trauma    PAST MEDICAL & SURGICAL HISTORY:  No pertinent past medical history    No significant past surgical history        MEDICATIONS  (STANDING):  piperacillin/tazobactam IVPB... 3.375 Gram(s) IV Intermittent once    MEDICATIONS  (PRN):      Allergies    No Known Allergies    Intolerances        VITALS:    Vital Signs Last 24 Hrs  T(C): 37.1 (17 Dec 2024 14:58), Max: 37.1 (17 Dec 2024 14:58)  T(F): 98.7 (17 Dec 2024 14:58), Max: 98.7 (17 Dec 2024 14:58)  HR: 95 (17 Dec 2024 14:58) (95 - 110)  BP: 131/86 (17 Dec 2024 14:58) (131/86 - 148/85)  BP(mean): --  RR: 18 (17 Dec 2024 14:58) (18 - 18)  SpO2: 98% (17 Dec 2024 14:58) (98% - 98%)    Parameters below as of 17 Dec 2024 14:58  Patient On (Oxygen Delivery Method): room air        LABS:                          13.2   20.23 )-----------( 338      ( 17 Dec 2024 16:10 )             39.3       12-17    137  |  104  |  14  ----------------------------<  140[H]  3.4[L]   |  26  |  0.94    Ca    9.8      17 Dec 2024 16:10    TPro  7.8  /  Alb  3.6  /  TBili  0.4  /  DBili  x   /  AST  15  /  ALT  36  /  AlkPhos  88  12-17        PT/INR - ( 17 Dec 2024 16:10 )   PT: 12.4 sec;   INR: 1.06 ratio         PTT - ( 17 Dec 2024 16:10 )  PTT:32.8 sec    LOWER EXTREMITY PHYSICAL EXAM:    General: NAD & AOX3.     Integument:  Skin warm, dry and supple bilateral.     Pain on palpation to the medial aspect of left foot. Redness and warmth noticed around the course of Posterior tibial tendon. These findings clinically  consistent with cellulitis rule out septic joint/gout arthritis.   No open wound/ulceration found bilaterally   Vascular: Dorsalis Pedis and Posterior Tibial pulses 2/4.  Capillary re-fill time less then 3 seconds digits 1-5 bilateral.  Mild-moderate edema on left foot   Neuro: Protective sensation intact/diminished to the level of the digits bilateral.   MSK: Muscle strength 5/5 all major muscle groups bilateral.     RADIOLOGY & ADDITIONAL STUDIES:  Xrays left foot taken and reviewed : No acute pathology noted  
[facility] DIV[facility] of INFECTIOUS DISEASE  Goyo Reinoso MD PhD, Laurie Guerrero MD, Tahira Mills MD, Mary Barreto MD, Ronn Ann MD  and providing coverage with Roger Anderson MD  Providing Infectious Disease Consultations at [facility], [facility]'s    Office# [phone] to schedule follow up appointments  Answering Service for urgent calls or New Consults [phone]  Cell# to text for urgent issues Goyo Reinoso [phone]     HPI:  54-year-old female who presents to the emergency department here at [facility] complaining of left foot and ankle pain.  Patient was seen here 3 days PTA complaining of atraumatic left foot pain with no history of gout able to weight-bear but pain worsened with weightbearing.      PAST MEDICAL & SURGICAL HISTORY:  No pertinent past medical history      No significant past surgical history          Antimicrobials  piperacillin/tazobactam IVPB.- 3.375 Gram(s) IV Intermittent once  piperacillin/tazobactam IVPB.. 3.375 Gram(s) IV Intermittent every 8 hours      Immunological  influenza   Vaccine 0.5 milliLiter(s) IntraMuscular once      Other  acetaminophen     Tablet .. 650 milliGRAM(s) Oral every 6 hours PRN  morphine  - Injectable 2 milliGRAM(s) IV Push every 4 hours PRN  oxycodone    5 mG/acetaminophen 325 mG 1 Tablet(s) Oral every 6 hours PRN      Allergies    No Known Allergies    Intolerances        SOCIAL HISTORY:  Social History:  neg (17 Dec 2024 18:17)      FAMILY HISTORY: noncontrib      ROS:    EYES:  Negative  blurry vision or double vision  GASTROINTESTINAL:  Negative for nausea, vomiting, diarrhea  -otherwise negative except for subjective    Vital Signs Last 24 Hrs  T(C): 37.6 (18 Dec 2024 07:00), Max: 39.8 (18 Dec 2024 04:52)  T(F): 99.6 (18 Dec 2024 07:00), Max: 103.6 (18 Dec 2024 04:52)  HR: 129 (18 Dec 2024 04:52) (95 - 129)  BP: 128/80 (18 Dec 2024 04:52) (123/81 - 148/85)  BP(mean): --  RR: 18 (18 Dec 2024 04:52) (17 - 18)  SpO2: 93% (18 Dec 2024 04:52) (93% - 98%)    Parameters below as of 18 Dec 2024 04:52  Patient On (Oxygen Delivery Method): room air        PE:  In no distress  HEENT:  NC, PERRL, sclerae anicteric, conjunctivae clear, EOMI.  Sinuses nontender, no nasal exudate.  No buccal or pharyngeal lesions, erythema or exudate  Neck:  Supple, no adenopathy  Lungs:  No accessory muscle use, bilaterally clear to auscultation  Cor:  distant  Abd:  Symmetric, normoactive BS.  Soft, nontender, no masses, guarding or rebound.  Liver and spleen not enlarged  Extrem:  No cyanosis, left lower extremity with swelling and warmth localized around the ankle with sig pain on both active and passive motion  Neuro: grossly intact  Musc: moving all limbs freely, no focal deficits        LABS:                        12.1   19.75 )-----------( 298      ( 18 Dec 2024 08:37 )             36.3       WBC Count: 19.75 K/uL (12-18-24 @ 08:37)  WBC Count: 20.23 K/uL (12-17-24 @ 16:10)      12-18    137  |  104  |  9   ----------------------------<  127[H]  3.6   |  25  |  0.63    Ca    9.2      18 Dec 2024 08:37    TPro  7.8  /  Alb  3.6  /  TBili  0.4  /  DBili  x   /  AST  15  /  ALT  36  /  AlkPhos  88  12-17      Creatinine: 0.63 mg/dL (12-18-24 @ 08:37)  Creatinine: 0.94 mg/dL (12-17-24 @ 16:10)      Urinalysis Basic - ( 18 Dec 2024 08:37 )    Color: x / Appearance: x / SG: x / pH: x  Gluc: 127 mg/dL / Ketone: x  / Bili: x / Urobili: x   Blood: x / Protein: x / Nitrite: x   Leuk Esterase: x / RBC: x / WBC x   Sq Epi: x / Non Sq Epi: x / Bacteria: x              MICROBIOLOGY:      RADIOLOGY & ADDITIONAL STUDIES:    --< from: MR Foot w/wo IV Cont, Left (12.17.24 @ 19:10) >    ACC: [id number] EXAM:  MR FOOT WAW IC LT   ORDERED BY: IRON OLIVAS     PROCEDURE DATE:  12/17/2024          INTERPRETATION:  Clinical Information: Left foot, rule out infection.    Comparison: Left ankle radiographs from 12/17/2024.    Technique:  MRI of the left ankle and left hindfoot and midfoot. 1 coronal   postcontrast sequence extends into the forefoot.  Intravenous Contrast: 8 cc of Gadavist contrast were administered in 2 cc   were discarded.    Findings:    There is rounded signal abnormality within the distal fibula and adjacent   talus with associated enhancement. There is and ankle joint effusion with   fluid centered at the anterolateral gutter. This could be related to   septic arthritis or degenerative arthrosis.    A calcaneocuboid effusion is also noted. There is also small fluid most   prominent at the fifth tarsometatarsal joint. These findings could be   infectious or reactive in etiology. There is a type II os navicular with   nonspecific marrow signal within the os navicularis and adjacent   navicular which could be related to os navicularis syndrome or   osteomyelitis. There is tenosynovitis of the posterior tibialis, flexor   digitorum and flexor hallucis longus tendons which could be infectious or   reactive in etiology. There is soft tissue edema and enhancement around   the hindfoot and midfoot which extends into the musculature and is   consistent with soft tissue infection. There is nonenhancement between   the os navicularis and medial talus which could be related to devitalized   or ischemic soft tissues. There is subcutaneous edema greatest medially   along the distal tibia and medial foot. There is mild intramuscular edema   within posterior tibialis and myofascial edema at the long flexor tendons   and around the interosseous ligament.    Foci of enhancement within the first metatarsal head are not well   evaluated on this hindfoot and midfoot study but could be related to   arthrosis and less likely septic arthritis.    Impression:  Soft tissue infection at the ankle and foot as above with devitalized or   ischemic soft tissue most prominent between the os navicularis and medial   talus.  Signal abnormality within the os naviculare and adjacent navicular which   could be related to osseous naviculare syndrome or osteomyelitis.  Ankle joint effusion most prominent at the anterolateral gutter with   signal abnormality within the distal fibula and adjacent talus which   could be related to septic arthritis or degenerative change.

## 2024-12-18 NOTE — CHART NOTE - NSCHARTNOTEFT_GEN_A_CORE
Called by RN for + blood cultures. Admission cx with [facility] in clusters 4/4 bottles. Currently on Cefepime for suspected OM vs cellulitis. Received one dose of Vanc on 12/17.   - MRSA PCR pending. Cannot r/o at this time  - Repeat blood cx  - Given Vancomycin x1 for MRSA coverage   - ID following

## 2024-12-18 NOTE — PATIENT PROFILE ADULT - FALL HARM RISK - HARM RISK INTERVENTIONS
Assistance with ambulation/Assistance OOB with selected safe patient handling equipment/Communicate Risk of Fall with Harm to all staff/Discuss with provider need for PT consult/Monitor gait and stability/Reinforce activity limits and safety measures with patient and family/Tailored Fall Risk Interventions/Visual Cue: Yellow wristband and red socks/Bed in lowest position, wheels locked, appropriate side rails in place/Call bell, personal items and telephone in reach/Instruct patient to call for assistance before getting out of bed or chair/Non-slip footwear when patient is out of bed/[place] to call system/Physically safe environment - no spills, clutter or unnecessary equipment/Purposeful Proactive Rounding/Room/bathroom lighting operational, light cord in reach

## 2024-12-18 NOTE — PROGRESS NOTE ADULT - ASSESSMENT
54-year-old female who presents to the emergency department here at [facility] complaining of left foot pain.  Patient was seen here 3 days ago complaining of atraumatic left foot pain with no history of gout able to weight-bear but pain worsened with weightbearing.  No fever no chills no nausea no vomiting no diarrhea no trauma      sepsis sec to Left foot infection with lactic acidosis POA   -  likely cellulitis vs OM  - cw abx as per ID   - podiatry fu   -  MRI reviewed  - check indium scan   - pain control  - fu cultures  - will monitor       Lovenox for DVT prophylaxis

## 2024-12-18 NOTE — CARE COORDINATION ASSESSMENT. - OTHER PERTINENT REFERRAL INFORMATION
CM met with patient and spouse Mae at bedside to explain role and transitions of care. Patient lives with spouse in a private house with 3 steps to get in and 12 to the second floor.  Patient was fully independent prior to admission.  No caregiver identified or home care.  Patient use crutches for ambulation.  No other DMEs.   Spouse will transport patient home when ready to be discharge.  No needs identified.  CM explained  home care expectation, process, insurance provision and home safety with good understanding. Patient and spouse verbalized understanding of plans after discharge and are in agreement.  Resource folder left at bedside.  All questions answered to the best of my abilities.  CM remains available throughout the hospital stay.

## 2024-12-18 NOTE — PROGRESS NOTE ADULT - SUBJECTIVE AND OBJECTIVE BOX
Patient is a 54y old  Female who presents with a chief complaint of Foot pain (18 Dec 2024 09:57)    Date of servie : 12-18-24 @ 13:50  INTERVAL HPI/OVERNIGHT EVENTS:  T(C): 36.6 (12-18-24 @ 11:16), Max: 39.8 (12-18-24 @ 04:52)  HR: 90 (12-18-24 @ 11:16) (90 - 129)  BP: 127/78 (12-18-24 @ 11:16) (123/81 - 131/86)  RR: 18 (12-18-24 @ 11:16) (17 - 18)  SpO2: 96% (12-18-24 @ 11:16) (93% - 98%)  Wt(kg): --  I&O's Summary      LABS:                        12.1   19.75 )-----------( 298      ( 18 Dec 2024 08:37 )             36.3     12-18    137  |  104  |  9   ----------------------------<  127[H]  3.6   |  25  |  0.63    Ca    9.2      18 Dec 2024 08:37    TPro  7.8  /  Alb  3.6  /  TBili  0.4  /  DBili  x   /  AST  15  /  ALT  36  /  AlkPhos  88  12-17    PT/INR - ( 17 Dec 2024 16:10 )   PT: 12.4 sec;   INR: 1.06 ratio         PTT - ( 17 Dec 2024 16:10 )  PTT:32.8 sec  Urinalysis Basic - ( 18 Dec 2024 08:37 )    Color: x / Appearance: x / SG: x / pH: x  Gluc: 127 mg/dL / Ketone: x  / Bili: x / Urobili: x   Blood: x / Protein: x / Nitrite: x   Leuk Esterase: x / RBC: x / WBC x   Sq Epi: x / Non Sq Epi: x / Bacteria: x      CAPILLARY BLOOD GLUCOSE            Urinalysis Basic - ( 18 Dec 2024 08:37 )    Color: x / Appearance: x / SG: x / pH: x  Gluc: 127 mg/dL / Ketone: x  / Bili: x / Urobili: x   Blood: x / Protein: x / Nitrite: x   Leuk Esterase: x / RBC: x / WBC x   Sq Epi: x / Non Sq Epi: x / Bacteria: x        MEDICATIONS  (STANDING):  cefepime   IVPB 2000 milliGRAM(s) IV Intermittent every 12 hours  influenza   Vaccine 0.5 milliLiter(s) IntraMuscular once    MEDICATIONS  (PRN):  acetaminophen     Tablet .. 650 milliGRAM(s) Oral every 6 hours PRN Temp greater or equal to 38C (100.4F), Mild Pain (1 - 3)  morphine  - Injectable 2 milliGRAM(s) IV Push every 4 hours PRN Severe Pain (7 - 10)  oxycodone    5 mG/acetaminophen 325 mG 1 Tablet(s) Oral every 6 hours PRN Moderate Pain (4 - 6)          PHYSICAL EXAM:  GENERAL: NAD, well-groomed, well-developed  HEAD:  Atraumatic, Normocephalic  CHEST/LUNG: Clear to percussion bilaterally; No rales, rhonchi, wheezing, or rubs  HEART: Regular rate and rhythm; No murmurs, rubs, or gallops  ABDOMEN: Soft, Nontender, Nondistended; Bowel sounds present  EXTREMITIES: Left foot swelling +    Care Discussed with Consultants/Other Providers [ ] YES  [ ] NO

## 2024-12-18 NOTE — PROGRESS NOTE ADULT - SUBJECTIVE AND OBJECTIVE BOX
PROGRESS NOTE   Patient is a 54y old  Female who presents with a chief complaint of Foot pain (17 Dec 2024 18:17)      HPI:  54-year-old female who presents to the emergency department here at [facility] complaining of left foot pain.  Patient was seen here 3 days ago complaining of atraumatic left foot pain with no history of gout able to weight-bear but pain worsened with weightbearing.  No fever no chills no nausea no vomiting no diarrhea no trauma (17 Dec 2024 18:17)      Vital Signs Last 24 Hrs  T(C): 37.6 (18 Dec 2024 07:00), Max: 39.8 (18 Dec 2024 04:52)  T(F): 99.6 (18 Dec 2024 07:00), Max: 103.6 (18 Dec 2024 04:52)  HR: 129 (18 Dec 2024 04:52) (95 - 129)  BP: 128/80 (18 Dec 2024 04:52) (123/81 - 148/85)  BP(mean): --  RR: 18 (18 Dec 2024 04:52) (17 - 18)  SpO2: 93% (18 Dec 2024 04:52) (93% - 98%)    Parameters below as of 18 Dec 2024 04:52  Patient On (Oxygen Delivery Method): room air                              13.2   20.23 )-----------( 338      ( 17 Dec 2024 16:10 )             39.3               12-17    137  |  104  |  14  ----------------------------<  140[H]  3.4[L]   |  26  |  0.94    Ca    9.8      17 Dec 2024 16:10    TPro  7.8  /  Alb  3.6  /  TBili  0.4  /  DBili  x   /  AST  15  /  ALT  36  /  AlkPhos  88  12-17      LOWER EXTREMITY PHYSICAL EXAM:    General: NAD & AOX3.     Integument:  Skin warm, dry and supple bilateral.     Pain on palpation to the medial aspect of left foot. Redness and warmth noticed around the course of Posterior tibial tendon. These findings clinically consistent with cellulitis rule out septic joint/gout arthritis.   No open wound/ulceration found bilaterally   Vascular: Dorsalis Pedis and Posterior Tibial pulses 2/4.  Capillary re-fill time less then 3 seconds digits 1-5 bilateral.  Mild-moderate edema on left foot   Neuro: Protective sensation intact/diminished to the level of the digits bilateral.   MSK: Muscle strength 5/5 all major muscle groups bilateral.     RADIOLOGY & ADDITIONAL STUDIES:  Xrays left foot taken and reviewed : No acute pathology noted    MRI Impression:  Soft tissue infection at the ankle and foot as above with devitalized or ischemic soft tissue most prominent between the os navicularis and medial talus.  Signal abnormality within the os naviculare and adjacent navicular which could be related to osseous naviculare syndrome or osteomyelitis.  Ankle joint effusion most prominent at the anterolateral gutter with signal abnormality within the distal fibula and adjacent talus which could be related to septic arthritis or degenerative change.

## 2024-12-18 NOTE — PROGRESS NOTE ADULT - PROBLEM SELECTOR PLAN 1
Discussed diagnosis and treatment with patient.  There is concern patient has cellulitis/infection rule out OM, gout, tendinitis.  Applied compression dressing on lower left leg.  Cellulitis improving today with improvement in tenderness to palpation of left medial foot.   Recs ID consult and continued IV Abx.  WBC 20k, continue to trend.  Based on MRI findings, will consider Indium-111 WBC scan to r/o OM, pending ID recs.   Weight bearing as tolerated on left foot   Recs elevation on left lower extremity.  Medical management as per Primary Team   Pt understands that surgical intervention maybe required at a later date if symptoms not remit  Plan discussed with attending.

## 2024-12-18 NOTE — CONSULT NOTE ADULT - ASSESSMENT
54-year-old female who presents to the emergency department here at [facility] complaining of left foot and ankle pain.  Patient was seen here 3 days PTA complaining of atraumatic left foot pain with no history of gout able to weight-bear but pain worsened with weightbearing.      RECOMMENDATIONS  SEPSIS pt meetings SIRS criteria with  Body temperature over 100.4°F (38°C) or under 96.8°F (36°C), Heart rate greater than 90 beats per minute, and  White blood cell count greater than 12,000 per µL (12 × 10^9 per L), with concern for infectious . Only obv localization is left ankle but findings seem minimal relative to septic picture. Potential for left ankle osteomyelitis or septic arthritis but as discussed with podiatry would be very challenging technically to try to obtain fluid for cell count and micro  rec:  Indium scan left ankle/foot  check nares MRSA PCR  Blood cultures collected 12/17-NGTD  will change abx to Cefepime 2 grams IV q12 (started 12/18)    Thank you for consulting us and involving us in the management of this most interesting and challenging case.  We will follow along in the care of this patient. Please call us at [phone] or text me directly on my cell# at [phone] with any concerns.    
54 y.o with left foot cellulitis rule out gout, septic joint, posterior tendinitis

## 2024-12-19 LAB
ALBUMIN SERPL ELPH-MCNC: 2.7 G/DL — LOW (ref 3.3–5)
ALP SERPL-CCNC: 124 U/L — HIGH (ref 40–120)
ALT FLD-CCNC: 62 U/L — SIGNIFICANT CHANGE UP (ref 12–78)
ANION GAP SERPL CALC-SCNC: 8 MMOL/L — SIGNIFICANT CHANGE UP (ref 5–17)
AST SERPL-CCNC: 34 U/L — SIGNIFICANT CHANGE UP (ref 15–37)
BILIRUB SERPL-MCNC: 0.1 MG/DL — LOW (ref 0.2–1.2)
BUN SERPL-MCNC: 8 MG/DL — SIGNIFICANT CHANGE UP (ref 7–23)
CALCIUM SERPL-MCNC: 9.3 MG/DL — SIGNIFICANT CHANGE UP (ref 8.5–10.1)
CHLORIDE SERPL-SCNC: 100 MMOL/L — SIGNIFICANT CHANGE UP (ref 96–108)
CO2 SERPL-SCNC: 26 MMOL/L — SIGNIFICANT CHANGE UP (ref 22–31)
CREAT SERPL-MCNC: 0.66 MG/DL — SIGNIFICANT CHANGE UP (ref 0.5–1.3)
EGFR: 105 ML/MIN/1.73M2 — SIGNIFICANT CHANGE UP
GLUCOSE SERPL-MCNC: 128 MG/DL — HIGH (ref 70–99)
GRAM STN FLD: ABNORMAL
HCT VFR BLD CALC: 37.4 % — SIGNIFICANT CHANGE UP (ref 34.5–45)
HGB BLD-MCNC: 12.6 G/DL — SIGNIFICANT CHANGE UP (ref 11.5–15.5)
MCHC RBC-ENTMCNC: 29.2 PG — SIGNIFICANT CHANGE UP (ref 27–34)
MCHC RBC-ENTMCNC: 33.7 G/DL — SIGNIFICANT CHANGE UP (ref 32–36)
MCV RBC AUTO: 86.6 FL — SIGNIFICANT CHANGE UP (ref 80–100)
MRSA PCR RESULT.: SIGNIFICANT CHANGE UP
NRBC # BLD: 0 /100 WBCS — SIGNIFICANT CHANGE UP (ref 0–0)
PLATELET # BLD AUTO: 314 K/UL — SIGNIFICANT CHANGE UP (ref 150–400)
POTASSIUM SERPL-MCNC: 3.9 MMOL/L — SIGNIFICANT CHANGE UP (ref 3.5–5.3)
POTASSIUM SERPL-SCNC: 3.9 MMOL/L — SIGNIFICANT CHANGE UP (ref 3.5–5.3)
PROT SERPL-MCNC: 7.2 G/DL — SIGNIFICANT CHANGE UP (ref 6–8.3)
RBC # BLD: 4.32 M/UL — SIGNIFICANT CHANGE UP (ref 3.8–5.2)
RBC # FLD: 13.5 % — SIGNIFICANT CHANGE UP (ref 10.3–14.5)
S AUREUS DNA NOSE QL NAA+PROBE: DETECTED
SODIUM SERPL-SCNC: 134 MMOL/L — LOW (ref 135–145)
SPECIMEN SOURCE: SIGNIFICANT CHANGE UP
URATE SERPL-MCNC: 3.7 MG/DL — SIGNIFICANT CHANGE UP (ref 2.5–7)
WBC # BLD: 17.13 K/UL — HIGH (ref 3.8–10.5)
WBC # FLD AUTO: 17.13 K/UL — HIGH (ref 3.8–10.5)

## 2024-12-19 RX ORDER — CALCIUM CARBONATE 750 MG/1
1 TABLET, CHEWABLE ORAL EVERY 6 HOURS
Refills: 0 | Status: DISCONTINUED | OUTPATIENT
Start: 2024-12-19 | End: 2024-12-24

## 2024-12-19 RX ORDER — POLYETHYLENE GLYCOL 3350 17 G/DOSE
17 POWDER (GRAM) ORAL DAILY
Refills: 0 | Status: DISCONTINUED | OUTPATIENT
Start: 2024-12-19 | End: 2024-12-22

## 2024-12-19 RX ORDER — CEFAZOLIN SODIUM 1 G
2000 VIAL (EA) INJECTION EVERY 8 HOURS
Refills: 0 | Status: DISCONTINUED | OUTPATIENT
Start: 2024-12-19 | End: 2024-12-24

## 2024-12-19 RX ORDER — PANTOPRAZOLE 40 MG/1
40 TABLET, DELAYED RELEASE ORAL DAILY
Refills: 0 | Status: DISCONTINUED | OUTPATIENT
Start: 2024-12-19 | End: 2024-12-24

## 2024-12-19 RX ORDER — SENNOSIDES 8.6 MG/1
2 TABLET, FILM COATED ORAL AT BEDTIME
Refills: 0 | Status: DISCONTINUED | OUTPATIENT
Start: 2024-12-19 | End: 2024-12-24

## 2024-12-19 RX ORDER — ENOXAPARIN SODIUM 60 MG/.6ML
40 INJECTION INTRAVENOUS; SUBCUTANEOUS EVERY 24 HOURS
Refills: 0 | Status: DISCONTINUED | OUTPATIENT
Start: 2024-12-19 | End: 2024-12-24

## 2024-12-19 RX ADMIN — Medication 100 MILLIGRAM(S): at 14:07

## 2024-12-19 RX ADMIN — Medication 100 MILLIGRAM(S): at 21:37

## 2024-12-19 RX ADMIN — ACETAMINOPHEN 650 MILLIGRAM(S): 80 SOLUTION/ DROPS ORAL at 13:55

## 2024-12-19 RX ADMIN — Medication 2 MILLIGRAM(S): at 10:22

## 2024-12-19 RX ADMIN — ACETAMINOPHEN 650 MILLIGRAM(S): 80 SOLUTION/ DROPS ORAL at 18:20

## 2024-12-19 RX ADMIN — ACETAMINOPHEN 650 MILLIGRAM(S): 80 SOLUTION/ DROPS ORAL at 17:35

## 2024-12-19 RX ADMIN — Medication 2 MILLIGRAM(S): at 09:40

## 2024-12-19 RX ADMIN — SENNOSIDES 2 TABLET(S): 8.6 TABLET, FILM COATED ORAL at 22:52

## 2024-12-19 RX ADMIN — Medication 100 MILLIGRAM(S): at 09:30

## 2024-12-19 RX ADMIN — ACETAMINOPHEN 650 MILLIGRAM(S): 80 SOLUTION/ DROPS ORAL at 11:57

## 2024-12-19 RX ADMIN — ACETAMINOPHEN 650 MILLIGRAM(S): 80 SOLUTION/ DROPS ORAL at 04:12

## 2024-12-19 RX ADMIN — ENOXAPARIN SODIUM 40 MILLIGRAM(S): 60 INJECTION INTRAVENOUS; SUBCUTANEOUS at 14:07

## 2024-12-19 RX ADMIN — PANTOPRAZOLE 40 MILLIGRAM(S): 40 TABLET, DELAYED RELEASE ORAL at 11:57

## 2024-12-19 RX ADMIN — ACETAMINOPHEN 650 MILLIGRAM(S): 80 SOLUTION/ DROPS ORAL at 03:12

## 2024-12-19 NOTE — PROGRESS NOTE ADULT - SUBJECTIVE AND OBJECTIVE BOX
Patient is a 54y old  Female who presents with a chief complaint of Foot pain (19 Dec 2024 10:50)    Date of servie : 12-19-24 @ 13:17  INTERVAL HPI/OVERNIGHT EVENTS:  T(C): 37.2 (12-19-24 @ 12:01), Max: 37.5 (12-19-24 @ 05:59)  HR: 90 (12-19-24 @ 12:01) (90 - 94)  BP: 131/80 (12-19-24 @ 12:01) (116/79 - 131/80)  RR: 18 (12-19-24 @ 12:01) (18 - 18)  SpO2: 94% (12-19-24 @ 12:01) (94% - 96%)  Wt(kg): --  I&O's Summary    18 Dec 2024 07:01  -  19 Dec 2024 07:00  --------------------------------------------------------  IN: 0 mL / OUT: 1700 mL / NET: -1700 mL        LABS:                        12.6   17.13 )-----------( 314      ( 19 Dec 2024 10:36 )             37.4     12-19    134[L]  |  100  |  8   ----------------------------<  128[H]  3.9   |  26  |  0.66    Ca    9.3      19 Dec 2024 10:36    TPro  7.2  /  Alb  2.7[L]  /  TBili  0.1[L]  /  DBili  x   /  AST  34  /  ALT  62  /  AlkPhos  124[H]  12-19    PT/INR - ( 17 Dec 2024 16:10 )   PT: 12.4 sec;   INR: 1.06 ratio         PTT - ( 17 Dec 2024 16:10 )  PTT:32.8 sec  Urinalysis Basic - ( 19 Dec 2024 10:36 )    Color: x / Appearance: x / SG: x / pH: x  Gluc: 128 mg/dL / Ketone: x  / Bili: x / Urobili: x   Blood: x / Protein: x / Nitrite: x   Leuk Esterase: x / RBC: x / WBC x   Sq Epi: x / Non Sq Epi: x / Bacteria: x      CAPILLARY BLOOD GLUCOSE            Urinalysis Basic - ( 19 Dec 2024 10:36 )    Color: x / Appearance: x / SG: x / pH: x  Gluc: 128 mg/dL / Ketone: x  / Bili: x / Urobili: x   Blood: x / Protein: x / Nitrite: x   Leuk Esterase: x / RBC: x / WBC x   Sq Epi: x / Non Sq Epi: x / Bacteria: x        MEDICATIONS  (STANDING):  ceFAZolin   IVPB 2000 milliGRAM(s) IV Intermittent every 8 hours  enoxaparin Injectable 40 milliGRAM(s) SubCutaneous every 24 hours  influenza   Vaccine 0.5 milliLiter(s) IntraMuscular once  pantoprazole   Suspension 40 milliGRAM(s) Oral daily  sodium chloride 0.9%. 1000 milliLiter(s) (500 mL/Hr) IV Continuous <Continuous>    MEDICATIONS  (PRN):  acetaminophen     Tablet .. 650 milliGRAM(s) Oral every 6 hours PRN Temp greater or equal to 38C (100.4F), Mild Pain (1 - 3)  calcium carbonate    500 mG (Tums) Chewable 1 Tablet(s) Chew every 6 hours PRN Heartburn  morphine  - Injectable 2 milliGRAM(s) IV Push every 4 hours PRN Severe Pain (7 - 10)  oxycodone    5 mG/acetaminophen 325 mG 1 Tablet(s) Oral every 6 hours PRN Moderate Pain (4 - 6)          PHYSICAL EXAM:  GENERAL: NAD, well-groomed, well-developed  HEAD:  Atraumatic, Normocephalic  CHEST/LUNG: Clear to percussion bilaterally; No rales, rhonchi, wheezing, or rubs  HEART: Regular rate and rhythm; No murmurs, rubs, or gallops  ABDOMEN: Soft, Nontender, Nondistended; Bowel sounds present  EXTREMITIES:  2+ Peripheral Pulses, No clubbing, cyanosis, or edema  LYMPH: No lymphadenopathy noted  SKIN: No rashes or lesions    Care Discussed with Consultants/Other Providers [ ] YES  [ ] NO

## 2024-12-19 NOTE — PROGRESS NOTE ADULT - PROBLEM SELECTOR PLAN 1
Discussed diagnosis and treatment with patient.  There is concern patient has cellulitis/infection rule out OM, gout, tendinitis.  Indium-111 WBC scan results pending.  Applied compression dressing on lower left leg.  Cellulitis improving today with improvement in tenderness to palpation of left medial foot.   Recs ID consult and continued IV Abx.  WBC 19.7k, continue to trend.  BCx positive for MSSA.  Weight bearing as tolerated on left foot   Rec continued elevation of left lower extremity.  Medical management as per Primary Team   Pt understands that surgical intervention maybe required at a later date if symptoms not remit.  Plan discussed with attending.

## 2024-12-19 NOTE — PROGRESS NOTE ADULT - ASSESSMENT
54-year-old female who presents to the emergency department here at [facility] complaining of left foot and ankle pain.  Patient was seen here 3 days PTA complaining of atraumatic left foot pain with no history of gout able to weight-bear but pain worsened with weightbearing.      RECOMMENDATIONS  MSSA Bacteremia with SEPSIS pt meetings SIRS criteria with  Body temperature over 100.4°F (38°C) or under 96.8°F (36°C), Heart rate greater than 90 beats per minute, and  White blood cell count greater than 12,000 per µL (12 × 10^9 per L), with concern for infectious . Only obv localization is left ankle but findings seem minimal relative to septic picture. Potential for left ankle osteomyelitis or septic arthritis but as discussed with podiatry would be very challenging technically to try to obtain fluid for cell count and micro  rec:  Ordered 3 phase bone scan left ankle/foot  Culture - Blood (12.17.24 @ 16:15) -  Methicillin SENSITIVE Staphylococcus aureus (MSSA)  Repeat Blood cultures orderes    changed abx to Cefepime 2 grams IV q12 (started 12/18) and Cefazolin started 12/19  Podiatry involved for any potential need for site control    Thank you for consulting us and involving us in the management of this most interesting and challenging case.  We will follow along in the care of this patient. Please call us at [phone] or text me directly on my cell# at [phone] with any concerns.

## 2024-12-19 NOTE — PROGRESS NOTE ADULT - SUBJECTIVE AND OBJECTIVE BOX
PROGRESS NOTE   Patient is a 54y old  Female who presents with a chief complaint of Foot pain (18 Dec 2024 13:50)      HPI:  54-year-old female who [facility] department [facility] at [facility] complaining of left foot pain.  Patient was seen here 3 days ago complaining of atraumatic left foot pain with no history of gout able to weight-bear but pain worsened with weightbearing.  No fever no chills no nausea no vomiting no diarrhea no trauma (17 Dec 2024 18:17)      Vital Signs Last 24 Hrs  T(C): 37.5 (19 Dec 2024 05:59), Max: 37.5 (19 Dec 2024 05:59)  T(F): 99.5 (19 Dec 2024 05:59), Max: 99.5 (19 Dec 2024 05:59)  HR: 91 (19 Dec 2024 05:59) (90 - 94)  BP: 118/76 (19 Dec 2024 05:05) (116/79 - 127/78)  BP(mean): --  RR: 18 (19 Dec 2024 05:05) (18 - 18)  SpO2: 94% (19 Dec 2024 05:05) (94% - 96%)    Parameters below as of 19 Dec 2024 05:05  Patient On (Oxygen Delivery Method): room air                              12.1   19.75 )-----------( 298      ( 18 Dec 2024 08:37 )             36.3               12-18    137  |  104  |  9   ----------------------------<  127[H]  3.6   |  25  |  0.63    Ca    9.2      18 Dec 2024 08:37    TPro  7.8  /  Alb  3.6  /  TBili  0.4  /  DBili  x   /  AST  15  /  ALT  36  /  AlkPhos  88  12-17      LOWER EXTREMITY PHYSICAL EXAM:    General: NAD & AOX3.     Integument:  Skin warm, dry and supple bilateral.     Pain on palpation to the medial aspect of left foot. Redness and warmth noticed around the course of Posterior tibial tendon. These findings clinically consistent with cellulitis rule out septic joint/gout arthritis.   No open wound/ulceration found bilaterally   Vascular: Dorsalis Pedis and Posterior Tibial pulses 2/4.  Capillary re-fill time less then 3 seconds digits 1-5 bilateral.  Mild-moderate edema on left foot   Neuro: Protective sensation intact/diminished to the level of the digits bilateral.   MSK: Muscle strength 5/5 all major muscle groups bilateral.     RADIOLOGY & ADDITIONAL STUDIES:  Xrays left foot taken and reviewed : No acute pathology noted    MRI Impression:  Soft tissue infection at the ankle and foot as above with devitalized or ischemic soft tissue most prominent between the os navicularis and medial talus.  Signal abnormality within the os naviculare and adjacent navicular which could be related to osseous naviculare syndrome or osteomyelitis.  Ankle joint effusion most prominent at the anterolateral gutter with signal abnormality within the distal fibula and adjacent talus which could be related to septic arthritis or degenerative change.       PROGRESS NOTE   Patient is a 54y old  Female who presents with a chief complaint of Foot pain (18 Dec 2024 13:50)      HPI:  54-year-old female who presents to the emergency department here at [facility] complaining of left foot pain.  Patient was seen here 3 days ago complaining of atraumatic left foot pain with no history of gout able to weight-bear but pain worsened with weightbearing.  No fever no chills no nausea no vomiting no diarrhea no trauma (17 Dec 2024 18:17)      Vital Signs Last 24 Hrs  T(C): 37.5 (19 Dec 2024 05:59), Max: 37.5 (19 Dec 2024 05:59)  T(F): 99.5 (19 Dec 2024 05:59), Max: 99.5 (19 Dec 2024 05:59)  HR: 91 (19 Dec 2024 05:59) (90 - 94)  BP: 118/76 (19 Dec 2024 05:05) (116/79 - 127/78)  BP(mean): --  RR: 18 (19 Dec 2024 05:05) (18 - 18)  SpO2: 94% (19 Dec 2024 05:05) (94% - 96%)    Parameters below as of 19 Dec 2024 05:05  Patient On (Oxygen Delivery Method): room air                              12.1   19.75 )-----------( 298      ( 18 Dec 2024 08:37 )             36.3               12-18    137  |  104  |  9   ----------------------------<  127[H]  3.6   |  25  |  0.63    Ca    9.2      18 Dec 2024 08:37    TPro  7.8  /  Alb  3.6  /  TBili  0.4  /  DBili  x   /  AST  15  /  ALT  36  /  AlkPhos  88  12-17      LOWER EXTREMITY PHYSICAL EXAM:    General: NAD & AOX3.     Integument:  Skin warm, dry and supple bilateral.     Pain on palpation to the medial aspect of left foot. Redness and warmth noticed around the course of Posterior tibial tendon. These findings clinically consistent with cellulitis rule out septic joint/gout arthritis.   No open wound/ulceration found bilaterally  The region of concern is responding favorable to the current care plan.   Vascular: Dorsalis Pedis and Posterior Tibial pulses 2/4.  Capillary re-fill time less then 3 seconds digits 1-5 bilateral.  Mild-moderate edema on left foot   Neuro: Protective sensation intact/diminished to the level of the digits bilateral.   MSK: Muscle strength 5/5 all major muscle groups bilateral.     RADIOLOGY & ADDITIONAL STUDIES:  Xrays left foot taken and reviewed : No acute pathology noted    MRI Impression:  Soft tissue infection at the ankle and foot as above with devitalized or ischemic soft tissue most prominent between the os navicularis and medial talus.  Signal abnormality within the os naviculare and adjacent navicular which could be related to osseous naviculare syndrome or osteomyelitis.  Ankle joint effusion most prominent at the anterolateral gutter with signal abnormality within the distal fibula and adjacent talus which could be related to septic arthritis or degenerative change.

## 2024-12-19 NOTE — PROGRESS NOTE ADULT - ASSESSMENT
54-year-old female who presents to the emergency department here at [facility] complaining of left foot pain.  Patient was seen here 3 days ago complaining of atraumatic left foot pain with no history of gout able to weight-bear but pain worsened with weightbearing.  No fever no chills no nausea no vomiting no diarrhea no trauma      Sepsis sec to Left foot infection with lactic acidosis POA ,MSSA bacteremia   - now with bacteremia  -  likely cellulitis vs OM  - cw abx as per ID , now on cefepime   - podiatry fu   -  MRI reviewed  - check indium scan   - pain control  - fu repeat  cultures  - will monitor       Lovenox for DVT prophylaxis

## 2024-12-19 NOTE — PROGRESS NOTE ADULT - SUBJECTIVE AND OBJECTIVE BOX
[facility] DIVISION of INFECTIOUS DISEASE  Goyo Reinoso MD PhD, Laurie Guerrero MD, Tahira Mills MD, Mary Barreto MD, Ronn Ann MD  and providing coverage with Roger Anderson MD  Providing Infectious Disease Consultations at [facility], [facility], [place], [place], [facility], [facility]'s    Office# [phone] to schedule follow up appointments  Answering Service for urgent calls or New Consults [phone]  Cell# to text for urgent issues Goyo Reinoso [phone]     infectious diseases progress note:    CHANCE AMBROSE is a 54y y. o. Female patient    Overnight and events of the last 24hrs reviewed    Allergies    No Known Allergies    Intolerances        ANTIBIOTICS/RELEVANT:  antimicrobials  ceFAZolin   IVPB 2000 milliGRAM(s) IV Intermittent every 8 hours    immunologic:  influenza   Vaccine 0.5 milliLiter(s) IntraMuscular once    OTHER:  acetaminophen     Tablet .. 650 milliGRAM(s) Oral every 6 hours PRN  morphine  - Injectable 2 milliGRAM(s) IV Push every 4 hours PRN  oxycodone    5 mG/acetaminophen 325 mG 1 Tablet(s) Oral every 6 hours PRN  sodium chloride 0.9%. 1000 milliLiter(s) IV Continuous <Continuous>      Objective:  Vital Signs Last 24 Hrs  T(C): 37.5 (19 Dec 2024 05:59), Max: 37.5 (19 Dec 2024 05:59)  T(F): 99.5 (19 Dec 2024 05:59), Max: 99.5 (19 Dec 2024 05:59)  HR: 91 (19 Dec 2024 05:59) (90 - 94)  BP: 118/76 (19 Dec 2024 05:05) (116/79 - 127/78)  BP(mean): --  RR: 18 (19 Dec 2024 05:05) (18 - 18)  SpO2: 94% (19 Dec 2024 05:05) (94% - 96%)    Parameters below as of 19 Dec 2024 05:05  Patient On (Oxygen Delivery Method): room air        T(C): 37.5 (12-19-24 @ 05:59), Max: 39.8 (12-18-24 @ 04:52)  T(C): 37.5 (12-19-24 @ 05:59), Max: 39.8 (12-18-24 @ 04:52)  T(C): 37.5 (12-19-24 @ 05:59), Max: 39.8 (12-18-24 @ 04:52)    PHYSICAL EXAM:  HEENT: NC atraumatic  Neck: supple  Respiratory: no accessory muscle use, breathing comfortably  Cardiovascular: distant  Gastrointestinal: normal appearing, nondistended  Extremities: no clubbing, no cyanosis,        LABS:                          12.1   19.75 )-----------( 298      ( 18 Dec 2024 08:37 )             36.3       WBC  19.75 12-18 @ 08:37  20.23 12-17 @ 16:10      12-18    137  |  104  |  9   ----------------------------<  127[H]  3.6   |  25  |  0.63    Ca    9.2      18 Dec 2024 08:37    TPro  7.8  /  Alb  3.6  /  TBili  0.4  /  DBili  x   /  AST  15  /  ALT  36  /  AlkPhos  88  12-17      Creatinine: 0.63 mg/dL (12-18-24 @ 08:37)  Creatinine: 0.94 mg/dL (12-17-24 @ 16:10)      PT/INR - ( 17 Dec 2024 16:10 )   PT: 12.4 sec;   INR: 1.06 ratio         PTT - ( 17 Dec 2024 16:10 )  PTT:32.8 sec  Urinalysis Basic - ( 18 Dec 2024 08:37 )    Color: x / Appearance: x / SG: x / pH: x  Gluc: 127 mg/dL / Ketone: x  / Bili: x / Urobili: x   Blood: x / Protein: x / Nitrite: x   Leuk Esterase: x / RBC: x / WBC x   Sq Epi: x / Non Sq Epi: x / Bacteria: x            INFLAMMATORY MARKERS      MICROBIOLOGY:    Culture - Blood (12.17.24 @ 16:15)    -  Methicillin SENSITIVE Staphylococcus aureus (MSSA): Detec Any isolate of Staphylococcus aureus from a blood culture is NOT considered a contaminant.   Gram Stain:   Growth in anaerobic bottle: Gram Positive Cocci in Clusters  Growth in aerobic bottle: Gram Positive Cocci in Clusters   Specimen Source: .Blood BLOOD   Organism: Blood Culture PCR   Culture Results:   Growth in aerobic and anaerobic bottles: Staphylococcus aureus  Direct identification is available within approximately 3-5  hours either by Blood Panel Multiplexed PCR or Direct  MALDI-TOF. Details: [email or web address]   Organism Identification: Blood Culture PCR   Method Type: PCR        RADIOLOGY & ADDITIONAL STUDIES:

## 2024-12-20 LAB
-  CLINDAMYCIN: SIGNIFICANT CHANGE UP
-  ERYTHROMYCIN: SIGNIFICANT CHANGE UP
-  GENTAMICIN: SIGNIFICANT CHANGE UP
-  OXACILLIN: SIGNIFICANT CHANGE UP
-  RIFAMPIN: SIGNIFICANT CHANGE UP
-  TETRACYCLINE: SIGNIFICANT CHANGE UP
-  TRIMETHOPRIM/SULFAMETHOXAZOLE: SIGNIFICANT CHANGE UP
-  VANCOMYCIN: SIGNIFICANT CHANGE UP
ALBUMIN SERPL ELPH-MCNC: 2.5 G/DL — LOW (ref 3.3–5)
ALP SERPL-CCNC: 154 U/L — HIGH (ref 40–120)
ALT FLD-CCNC: 91 U/L — HIGH (ref 12–78)
ANION GAP SERPL CALC-SCNC: 12 MMOL/L — SIGNIFICANT CHANGE UP (ref 5–17)
AST SERPL-CCNC: 62 U/L — HIGH (ref 15–37)
BILIRUB SERPL-MCNC: 0.3 MG/DL — SIGNIFICANT CHANGE UP (ref 0.2–1.2)
BUN SERPL-MCNC: 10 MG/DL — SIGNIFICANT CHANGE UP (ref 7–23)
CALCIUM SERPL-MCNC: 10 MG/DL — SIGNIFICANT CHANGE UP (ref 8.5–10.1)
CHLORIDE SERPL-SCNC: 103 MMOL/L — SIGNIFICANT CHANGE UP (ref 96–108)
CO2 SERPL-SCNC: 21 MMOL/L — LOW (ref 22–31)
CREAT SERPL-MCNC: 0.55 MG/DL — SIGNIFICANT CHANGE UP (ref 0.5–1.3)
CULTURE RESULTS: ABNORMAL
EGFR: 110 ML/MIN/1.73M2 — SIGNIFICANT CHANGE UP
GLUCOSE SERPL-MCNC: 122 MG/DL — HIGH (ref 70–99)
GRAM STN FLD: ABNORMAL
HCT VFR BLD CALC: 36 % — SIGNIFICANT CHANGE UP (ref 34.5–45)
HGB BLD-MCNC: 12.1 G/DL — SIGNIFICANT CHANGE UP (ref 11.5–15.5)
MCHC RBC-ENTMCNC: 28.9 PG — SIGNIFICANT CHANGE UP (ref 27–34)
MCHC RBC-ENTMCNC: 33.6 G/DL — SIGNIFICANT CHANGE UP (ref 32–36)
MCV RBC AUTO: 85.9 FL — SIGNIFICANT CHANGE UP (ref 80–100)
METHOD TYPE: SIGNIFICANT CHANGE UP
NRBC # BLD: 0 /100 WBCS — SIGNIFICANT CHANGE UP (ref 0–0)
ORGANISM # SPEC MICROSCOPIC CNT: ABNORMAL
ORGANISM # SPEC MICROSCOPIC CNT: ABNORMAL
ORGANISM # SPEC MICROSCOPIC CNT: SIGNIFICANT CHANGE UP
PLATELET # BLD AUTO: 311 K/UL — SIGNIFICANT CHANGE UP (ref 150–400)
POTASSIUM SERPL-MCNC: 3.6 MMOL/L — SIGNIFICANT CHANGE UP (ref 3.5–5.3)
POTASSIUM SERPL-SCNC: 3.6 MMOL/L — SIGNIFICANT CHANGE UP (ref 3.5–5.3)
PROT SERPL-MCNC: 7.7 G/DL — SIGNIFICANT CHANGE UP (ref 6–8.3)
RBC # BLD: 4.19 M/UL — SIGNIFICANT CHANGE UP (ref 3.8–5.2)
RBC # FLD: 13.7 % — SIGNIFICANT CHANGE UP (ref 10.3–14.5)
SODIUM SERPL-SCNC: 136 MMOL/L — SIGNIFICANT CHANGE UP (ref 135–145)
SPECIMEN SOURCE: SIGNIFICANT CHANGE UP
WBC # BLD: 16.93 K/UL — HIGH (ref 3.8–10.5)
WBC # FLD AUTO: 16.93 K/UL — HIGH (ref 3.8–10.5)

## 2024-12-20 PROCEDURE — 78102 BONE MARROW IMAGING LTD: CPT | Mod: 26

## 2024-12-20 PROCEDURE — 78800 RP LOCLZJ TUM 1 AREA 1 D IMG: CPT | Mod: 26

## 2024-12-20 RX ADMIN — ACETAMINOPHEN 650 MILLIGRAM(S): 80 SOLUTION/ DROPS ORAL at 11:32

## 2024-12-20 RX ADMIN — ENOXAPARIN SODIUM 40 MILLIGRAM(S): 60 INJECTION INTRAVENOUS; SUBCUTANEOUS at 15:22

## 2024-12-20 RX ADMIN — ACETAMINOPHEN 650 MILLIGRAM(S): 80 SOLUTION/ DROPS ORAL at 12:45

## 2024-12-20 RX ADMIN — ACETAMINOPHEN 650 MILLIGRAM(S): 80 SOLUTION/ DROPS ORAL at 13:18

## 2024-12-20 RX ADMIN — ACETAMINOPHEN 650 MILLIGRAM(S): 80 SOLUTION/ DROPS ORAL at 18:47

## 2024-12-20 RX ADMIN — Medication 100 MILLIGRAM(S): at 05:08

## 2024-12-20 RX ADMIN — Medication 100 MILLIGRAM(S): at 21:02

## 2024-12-20 RX ADMIN — SENNOSIDES 2 TABLET(S): 8.6 TABLET, FILM COATED ORAL at 21:01

## 2024-12-20 RX ADMIN — ACETAMINOPHEN 650 MILLIGRAM(S): 80 SOLUTION/ DROPS ORAL at 06:45

## 2024-12-20 RX ADMIN — Medication 100 MILLIGRAM(S): at 15:23

## 2024-12-20 RX ADMIN — PANTOPRAZOLE 40 MILLIGRAM(S): 40 TABLET, DELAYED RELEASE ORAL at 11:39

## 2024-12-20 NOTE — PROGRESS NOTE ADULT - SUBJECTIVE AND OBJECTIVE BOX
[facility] DIV[facility] of INFECTIOUS DISEASE  Goyo Reinoso MD PhD, Laurie Guerrero MD, Tahira Mills MD, Mary Barreto MD, Ronn Ann MD  and providing coverage with Roger Anderson MD  Providing Infectious Disease Consultations at [facility], [facility], [place], [place], [facility], [facility]'s    Office# [phone] to schedule follow up appointments  Answering Service for urgent calls or New Consults [phone]  Cell# to text for urgent issues Goyo Reinoso [phone]     infectious diseases progress note:    CHANCE AMBROSE is a 54y y. o. Female patient    Overnight and events of the last 24hrs reviewed    Allergies    No Known Allergies    Intolerances        ANTIBIOTICS/RELEVANT:  antimicrobials  ceFAZolin   IVPB 2000 milliGRAM(s) IV Intermittent every 8 hours    immunologic:  influenza   Vaccine 0.5 milliLiter(s) IntraMuscular once    OTHER:  acetaminophen     Tablet .. 650 milliGRAM(s) Oral every 6 hours PRN  calcium carbonate    500 mG (Tums) Chewable 1 Tablet(s) Chew every 6 hours PRN  enoxaparin Injectable 40 milliGRAM(s) SubCutaneous every 24 hours  morphine  - Injectable 2 milliGRAM(s) IV Push every 4 hours PRN  oxycodone    5 mG/acetaminophen 325 mG 1 Tablet(s) Oral every 6 hours PRN  pantoprazole   Suspension 40 milliGRAM(s) Oral daily  polyethylene glycol 3350 17 Gram(s) Oral daily PRN  senna 2 Tablet(s) Oral at bedtime  sodium chloride 0.9%. 1000 milliLiter(s) IV Continuous <Continuous>      Objective:  Vital Signs Last 24 Hrs  T(C): 36.3 (20 Dec 2024 05:24), Max: 37.3 (19 Dec 2024 20:14)  T(F): 97.3 (20 Dec 2024 05:24), Max: 99.2 (19 Dec 2024 20:14)  HR: 86 (20 Dec 2024 05:24) (86 - 96)  BP: 148/86 (20 Dec 2024 05:24) (113/77 - 148/86)  BP(mean): --  RR: 18 (20 Dec 2024 05:24) (18 - 18)  SpO2: 94% (20 Dec 2024 05:24) (94% - 94%)    Parameters below as of 20 Dec 2024 05:24  Patient On (Oxygen Delivery Method): room air        T(C): 36.3 (12-20-24 @ 05:24), Max: 37.5 (12-19-24 @ 05:59)  T(C): 36.3 (12-20-24 @ 05:24), Max: 39.8 (12-18-24 @ 04:52)  T(C): 36.3 (12-20-24 @ 05:24), Max: 39.8 (12-18-24 @ 04:52)    PHYSICAL EXAM:  HEENT: NC atraumatic  Neck: supple  Respiratory: no accessory muscle use, breathing comfortably  Cardiovascular: distant  Gastrointestinal: normal appearing, nondistended  Extremities: no clubbing, no cyanosis, some improved mobility Left ankle        LABS:                          12.1   16.93 )-----------( 311      ( 20 Dec 2024 08:23 )             36.0       WBC  16.93 12-20 @ 08:23  17.13 12-19 @ 10:36  19.75 12-18 @ 08:37  20.23 12-17 @ 16:10      12-20    136  |  103  |  10  ----------------------------<  122[H]  3.6   |  21[L]  |  0.55    Ca    10.0      20 Dec 2024 08:23    TPro  7.7  /  Alb  2.5[L]  /  TBili  0.3  /  DBili  x   /  AST  62[H]  /  ALT  91[H]  /  AlkPhos  154[H]  12-20      Creatinine: 0.55 mg/dL (12-20-24 @ 08:23)  Creatinine: 0.66 mg/dL (12-19-24 @ 10:36)  Creatinine: 0.63 mg/dL (12-18-24 @ 08:37)  Creatinine: 0.94 mg/dL (12-17-24 @ 16:10)        Urinalysis Basic - ( 20 Dec 2024 08:23 )    Color: x / Appearance: x / SG: x / pH: x  Gluc: 122 mg/dL / Ketone: x  / Bili: x / Urobili: x   Blood: x / Protein: x / Nitrite: x   Leuk Esterase: x / RBC: x / WBC x   Sq Epi: x / Non Sq Epi: x / Bacteria: x            INFLAMMATORY MARKERS      MICROBIOLOGY:    Culture - Blood (12.18.24 @ 22:20)    Gram Stain:   Growth in anaerobic bottle: Gram Positive Cocci in Clusters   Specimen Source: .Blood BLOOD   Culture Results:   Growth in anaerobic bottle: Gram Positive Cocci in Clusters        RADIOLOGY & ADDITIONAL STUDIES:

## 2024-12-20 NOTE — PROGRESS NOTE ADULT - ASSESSMENT
54-year-old female who presents to the emergency department here at [facility] complaining of left foot pain.  Patient was seen here 3 days ago complaining of atraumatic left foot pain with no history of gout able to weight-bear but pain worsened with weightbearing.  No fever no chills no nausea no vomiting no diarrhea no trauma      Sepsis sec to Left foot infection with lactic acidosis POA Persistent MSSA bacteremia   - now with bacteremia  -  likely cellulitis vs OM  - cw abx as per ID , now on cefepime   - podiatry fu   - MRI reviewed  - check indium scan   - check TTE   - pain control  - fu repeat  cultures  - will monitor   - ID fu     Lovenox for DVT prophylaxis     Case dw pt and family at bedside

## 2024-12-20 NOTE — PROGRESS NOTE ADULT - ASSESSMENT
54-year-old female who presents to the emergency department here at [facility] complaining of left foot and ankle pain.  Patient was seen here 3 days PTA complaining of atraumatic left foot pain with no history of gout able to weight-bear but pain worsened with weightbearing.      RECOMMENDATIONS  MSSA Bacteremia with SEPSIS pt meetings SIRS criteria with  Body temperature over 100.4°F (38°C) or under 96.8°F (36°C), Heart rate greater than 90 beats per minute, and  White blood cell count greater than 12,000 per µL (12 × 10^9 per L), with concern for infectious . Only obv localization is left ankle but findings seem minimal relative to septic picture. Potential for left ankle osteomyelitis or septic arthritis but as discussed with podiatry would be very challenging technically to try to obtain fluid for cell count and micro  rec:  Ordered 3 phase bone scan left ankle/foot-in process  Culture - Blood (12.17.24 @ 16:15) -  Methicillin SENSITIVE Staphylococcus aureus (MSSA)  Culture - Blood (12.18.24 @ 22:20) - Growth in anaerobic bottle: Gram Positive Cocci in Clusters  Culture - Blood (12.20.24 @ 05:00) - collected  Culture - Blood (12.22.24 am) - ordered    changed abx to Cefepime 2 grams IV q12 (started 12/18) and Cefazolin started 12/19  Podiatry involved for any potential need for site control  with persistent positive blood culture recommend  -nonurgent TTE    Thank you for consulting us and involving us in the management of this most interesting and challenging case.  We will follow along in the care of this patient. Please call us at [phone] or text me directly on my cell# at [phone] with any concerns.

## 2024-12-20 NOTE — PROGRESS NOTE ADULT - SUBJECTIVE AND OBJECTIVE BOX
PROGRESS NOTE   Patient is a 54y old  Female who presents with a chief complaint of Foot pain (19 Dec 2024 13:16)      HPI:  54-year-old female who presents to the emergency department here at [facility] complaining of left foot pain.  Patient was seen here 3 days ago complaining of atraumatic left foot pain with no history of gout able to weight-bear but pain worsened with weightbearing.  No fever no chills no nausea no vomiting no diarrhea no trauma (17 Dec 2024 18:17)      Vital Signs Last 24 Hrs  T(C): 36.3 (20 Dec 2024 05:24), Max: 37.3 (19 Dec 2024 20:14)  T(F): 97.3 (20 Dec 2024 05:24), Max: 99.2 (19 Dec 2024 20:14)  HR: 86 (20 Dec 2024 05:24) (86 - 96)  BP: 148/86 (20 Dec 2024 05:24) (113/77 - 148/86)  BP(mean): --  RR: 18 (20 Dec 2024 05:24) (18 - 18)  SpO2: 94% (20 Dec 2024 05:24) (94% - 94%)    Parameters below as of 20 Dec 2024 05:24  Patient On (Oxygen Delivery Method): room air                              12.1   16.93 )-----------( 311      ( 20 Dec 2024 08:23 )             36.0               12-19    134[L]  |  100  |  8   ----------------------------<  128[H]  3.9   |  26  |  0.66    Ca    9.3      19 Dec 2024 10:36    TPro  7.2  /  Alb  2.7[L]  /  TBili  0.1[L]  /  DBili  x   /  AST  34  /  ALT  62  /  AlkPhos  124[H]  12-19      PHYSICAL EXAM  General: NAD & AOX3.     Integument:  Skin warm, dry and supple bilateral.     reduced erythema and calor noticed around the course of Posterior tibial tendon. These findings clinically consistent with cellulitis rule out septic joint/gout arthritis.   No open wound/ulceration found bilaterally.  The region of concern is responding favorable to the current care plan.   Vascular: Dorsalis Pedis and Posterior Tibial pulses 2/4.  Capillary re-fill time less then 3 seconds digits 1-5 bilateral.  reduced edema on left foot   Neuro: Protective sensation intact to the level of the digits bilateral.   MSK: Muscle strength 5/5 all major muscle groups bilateral. Reduced pain on palpation to the medial aspect of left foot     RADIOLOGY & ADDITIONAL STUDIES:  Xrays left foot taken and reviewed : No acute pathology noted    MRI Impression:  Soft tissue infection at the ankle and foot as above with devitalized or ischemic soft tissue most prominent between the os navicularis and medial talus.  Signal abnormality within the os naviculare and adjacent navicular which could be related to osseous naviculare syndrome or osteomyelitis.  Ankle joint effusion most prominent at the anterolateral gutter with signal abnormality within the distal fibula and adjacent talus which could be related to septic arthritis or degenerative change.

## 2024-12-20 NOTE — CASE MANAGEMENT PROGRESS NOTE - NSCMPROGRESSNOTE_GEN_ALL_CORE
Patient discussed in rounds and remains acute on Ancef and IVF.  Second day of bone scan today.  +BC : cocci in clusters.  Discharge disposition is home with unclear services.  to transport when ready to discharge.  CM remains available throughout hospital stay.     Patient discussed in rounds and remains acute on Ancef and IVF.  Second day of bone scan today.  +BC : Gram Positive Cocci in Clusters.  Discharge disposition is home with unclear services.  to transport when ready to discharge.  CM remains available throughout hospital stay.

## 2024-12-20 NOTE — PROGRESS NOTE ADULT - SUBJECTIVE AND OBJECTIVE BOX
Patient is a 54y old  Female who presents with a chief complaint of Foot pain (20 Dec 2024 11:10)    Date of servie : 12-20-24 @ 11:37  INTERVAL HPI/OVERNIGHT EVENTS:  T(C): 36.3 (12-20-24 @ 05:24), Max: 37.3 (12-19-24 @ 20:14)  HR: 86 (12-20-24 @ 05:24) (86 - 96)  BP: 148/86 (12-20-24 @ 05:24) (113/77 - 148/86)  RR: 18 (12-20-24 @ 05:24) (18 - 18)  SpO2: 94% (12-20-24 @ 05:24) (94% - 94%)  Wt(kg): --  I&O's Summary    19 Dec 2024 07:01  -  20 Dec 2024 07:00  --------------------------------------------------------  IN: 0 mL / OUT: 2700 mL / NET: -2700 mL        LABS:                        12.1   16.93 )-----------( 311      ( 20 Dec 2024 08:23 )             36.0     12-20    136  |  103  |  10  ----------------------------<  122[H]  3.6   |  21[L]  |  0.55    Ca    10.0      20 Dec 2024 08:23    TPro  7.7  /  Alb  2.5[L]  /  TBili  0.3  /  DBili  x   /  AST  62[H]  /  ALT  91[H]  /  AlkPhos  154[H]  12-20      Urinalysis Basic - ( 20 Dec 2024 08:23 )    Color: x / Appearance: x / SG: x / pH: x  Gluc: 122 mg/dL / Ketone: x  / Bili: x / Urobili: x   Blood: x / Protein: x / Nitrite: x   Leuk Esterase: x / RBC: x / WBC x   Sq Epi: x / Non Sq Epi: x / Bacteria: x      CAPILLARY BLOOD GLUCOSE            Urinalysis Basic - ( 20 Dec 2024 08:23 )    Color: x / Appearance: x / SG: x / pH: x  Gluc: 122 mg/dL / Ketone: x  / Bili: x / Urobili: x   Blood: x / Protein: x / Nitrite: x   Leuk Esterase: x / RBC: x / WBC x   Sq Epi: x / Non Sq Epi: x / Bacteria: x        MEDICATIONS  (STANDING):  ceFAZolin   IVPB 2000 milliGRAM(s) IV Intermittent every 8 hours  enoxaparin Injectable 40 milliGRAM(s) SubCutaneous every 24 hours  influenza   Vaccine 0.5 milliLiter(s) IntraMuscular once  pantoprazole   Suspension 40 milliGRAM(s) Oral daily  senna 2 Tablet(s) Oral at bedtime  sodium chloride 0.9%. 1000 milliLiter(s) (500 mL/Hr) IV Continuous <Continuous>    MEDICATIONS  (PRN):  acetaminophen     Tablet .. 650 milliGRAM(s) Oral every 6 hours PRN Temp greater or equal to 38C (100.4F), Mild Pain (1 - 3)  calcium carbonate    500 mG (Tums) Chewable 1 Tablet(s) Chew every 6 hours PRN Heartburn  morphine  - Injectable 2 milliGRAM(s) IV Push every 4 hours PRN Severe Pain (7 - 10)  oxycodone    5 mG/acetaminophen 325 mG 1 Tablet(s) Oral every 6 hours PRN Moderate Pain (4 - 6)  polyethylene glycol 3350 17 Gram(s) Oral daily PRN Constipation          PHYSICAL EXAM:  GENERAL: NAD, well-groomed, well-developed  HEAD:  Atraumatic, Normocephalic  CHEST/LUNG: Clear to percussion bilaterally; No rales, rhonchi, wheezing, or rubs  HEART: Regular rate and rhythm; No murmurs, rubs, or gallops  ABDOMEN: Soft, Nontender, Nondistended; Bowel sounds present  EXTREMITIES:  Left foot dressing +    Care Discussed with Consultants/Other Providers [ ] YES  [ ] NO

## 2024-12-20 NOTE — PROGRESS NOTE ADULT - PROBLEM SELECTOR PLAN 1
Discussed diagnosis and treatment with patient.  There is concern patient has cellulitis/infection rule out OM, gout, tendinitis.  Area of concern responding favorably to current treatment plan  Indium-111 WBC scan results pending.  Applied compression dressing on lower left leg.  Recs ID consult and continued IV Abx.  WBC continue to trend.  BCx positive for MSSA.  Weight bearing as tolerated on left foot   Rec continued elevation of left lower extremity.  Medical management as per Primary Team   Pt understands that surgical intervention maybe required at a later date if symptoms not remit.  Will discuss with all attendings

## 2024-12-21 ENCOUNTER — RESULT REVIEW (OUTPATIENT)
Age: 54
End: 2024-12-21

## 2024-12-21 LAB
ALBUMIN SERPL ELPH-MCNC: 2.5 G/DL — LOW (ref 3.3–5)
ALP SERPL-CCNC: 173 U/L — HIGH (ref 40–120)
ALT FLD-CCNC: 109 U/L — HIGH (ref 12–78)
ANION GAP SERPL CALC-SCNC: 11 MMOL/L — SIGNIFICANT CHANGE UP (ref 5–17)
AST SERPL-CCNC: 73 U/L — HIGH (ref 15–37)
BILIRUB SERPL-MCNC: 0.2 MG/DL — SIGNIFICANT CHANGE UP (ref 0.2–1.2)
BUN SERPL-MCNC: 11 MG/DL — SIGNIFICANT CHANGE UP (ref 7–23)
CALCIUM SERPL-MCNC: 9.5 MG/DL — SIGNIFICANT CHANGE UP (ref 8.5–10.1)
CHLORIDE SERPL-SCNC: 101 MMOL/L — SIGNIFICANT CHANGE UP (ref 96–108)
CO2 SERPL-SCNC: 26 MMOL/L — SIGNIFICANT CHANGE UP (ref 22–31)
CREAT SERPL-MCNC: 0.62 MG/DL — SIGNIFICANT CHANGE UP (ref 0.5–1.3)
CULTURE RESULTS: ABNORMAL
EGFR: 107 ML/MIN/1.73M2 — SIGNIFICANT CHANGE UP
GLUCOSE SERPL-MCNC: 108 MG/DL — HIGH (ref 70–99)
HCT VFR BLD CALC: 36.6 % — SIGNIFICANT CHANGE UP (ref 34.5–45)
HGB BLD-MCNC: 12.3 G/DL — SIGNIFICANT CHANGE UP (ref 11.5–15.5)
MCHC RBC-ENTMCNC: 28.9 PG — SIGNIFICANT CHANGE UP (ref 27–34)
MCHC RBC-ENTMCNC: 33.6 G/DL — SIGNIFICANT CHANGE UP (ref 32–36)
MCV RBC AUTO: 86.1 FL — SIGNIFICANT CHANGE UP (ref 80–100)
NRBC # BLD: 0 /100 WBCS — SIGNIFICANT CHANGE UP (ref 0–0)
PLATELET # BLD AUTO: 360 K/UL — SIGNIFICANT CHANGE UP (ref 150–400)
POTASSIUM SERPL-MCNC: 3.9 MMOL/L — SIGNIFICANT CHANGE UP (ref 3.5–5.3)
POTASSIUM SERPL-SCNC: 3.9 MMOL/L — SIGNIFICANT CHANGE UP (ref 3.5–5.3)
PROT SERPL-MCNC: 7.4 G/DL — SIGNIFICANT CHANGE UP (ref 6–8.3)
RBC # BLD: 4.25 M/UL — SIGNIFICANT CHANGE UP (ref 3.8–5.2)
RBC # FLD: 13.9 % — SIGNIFICANT CHANGE UP (ref 10.3–14.5)
SODIUM SERPL-SCNC: 138 MMOL/L — SIGNIFICANT CHANGE UP (ref 135–145)
SPECIMEN SOURCE: SIGNIFICANT CHANGE UP
WBC # BLD: 16 K/UL — HIGH (ref 3.8–10.5)
WBC # FLD AUTO: 16 K/UL — HIGH (ref 3.8–10.5)

## 2024-12-21 PROCEDURE — 93306 TTE W/DOPPLER COMPLETE: CPT | Mod: 26

## 2024-12-21 PROCEDURE — 99233 SBSQ HOSP IP/OBS HIGH 50: CPT

## 2024-12-21 RX ADMIN — PANTOPRAZOLE 40 MILLIGRAM(S): 40 TABLET, DELAYED RELEASE ORAL at 11:09

## 2024-12-21 RX ADMIN — ACETAMINOPHEN 650 MILLIGRAM(S): 80 SOLUTION/ DROPS ORAL at 00:47

## 2024-12-21 RX ADMIN — Medication 100 MILLIGRAM(S): at 05:14

## 2024-12-21 RX ADMIN — ACETAMINOPHEN 650 MILLIGRAM(S): 80 SOLUTION/ DROPS ORAL at 01:47

## 2024-12-21 RX ADMIN — Medication 100 MILLIGRAM(S): at 14:22

## 2024-12-21 RX ADMIN — ENOXAPARIN SODIUM 40 MILLIGRAM(S): 60 INJECTION INTRAVENOUS; SUBCUTANEOUS at 14:22

## 2024-12-21 RX ADMIN — Medication 100 MILLIGRAM(S): at 22:49

## 2024-12-21 RX ADMIN — ACETAMINOPHEN 650 MILLIGRAM(S): 80 SOLUTION/ DROPS ORAL at 11:10

## 2024-12-21 NOTE — PROGRESS NOTE ADULT - ASSESSMENT
54-year-old female who presents to the emergency department here at [facility] complaining of left foot pain.  Patient was seen here 3 days ago complaining of atraumatic left foot pain with no history of gout able to weight-bear but pain worsened with weightbearing.  No fever no chills no nausea no vomiting no diarrhea no trauma      1.  Sepsis sec to Left foot infection with lactic acidosis POA Persistent MSSA bacteremia   - cw abx as per ID , now on cefepime - Ancef   - MRI: Soft tissue infection at the ankle and foot as above with devitalized or   ischemic soft tissue most prominent between the os navicularis and medial   talus.  Signal abnormality within the os naviculare and adjacent navicular which   could be related to osseous naviculare syndrome or osteomyelitis.  Ankle joint effusion most prominent at the anterolateral gutter with   signal abnormality within the distal fibula and adjacent talus which   could be related to septic arthritis or degenerative change.    - check indium scan : Abnormal combined Indium-111 labeled leukocyte study and marrow scan.Incongruent uptake in the left talus and navicular bone compatible with   osteomyelitis/septic arthritis.  - check TTE , result pending   - pain control  -  repeat  blood cultures 12/20  NTD   - will need long term IV abx, Picc line next week, patient agreed   - ID fu     Lovenox for DVT prophylaxis

## 2024-12-21 NOTE — PROGRESS NOTE ADULT - PROBLEM SELECTOR PLAN 1
Discussed diagnosis and treatment with patient.  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  Area of concern responding favorably to current treatment plan  Indium-111 WBC scan demonstrates incongruent uptake in the left talus and navicular bone compatible with osteomyelitis/septic arthritis.   Applied compression dressing on lower left leg.  Rec long term IV abx at this time as per ID recs.  No surgical intervention planned at this time.  WBC continue to trend.  Weight bearing as tolerated on left foot   Rec continued elevation of left lower extremity.  Medical management as per Primary Team   Pt understands that surgical intervention maybe required at a later date if symptoms not remit.  Will discuss with all attendings.

## 2024-12-21 NOTE — PROGRESS NOTE ADULT - SUBJECTIVE AND OBJECTIVE BOX
Patient is a 54y old  Female who presents with a chief complaint of Foot pain (21 Dec 2024 08:53)      Subjective:  INTERVAL HPI/OVERNIGHT EVENTS: Patient seen and examined at bedside.  Patient has no complaints at this time.   MEDICATIONS  (STANDING):  ceFAZolin   IVPB 2000 milliGRAM(s) IV Intermittent every 8 hours  enoxaparin Injectable 40 milliGRAM(s) SubCutaneous every 24 hours  influenza   Vaccine 0.5 milliLiter(s) IntraMuscular once  pantoprazole   Suspension 40 milliGRAM(s) Oral daily  senna 2 Tablet(s) Oral at bedtime  sodium chloride 0.9%. 1000 milliLiter(s) (500 mL/Hr) IV Continuous <Continuous>    MEDICATIONS  (PRN):  acetaminophen     Tablet .. 650 milliGRAM(s) Oral every 6 hours PRN Temp greater or equal to 38C (100.4F), Mild Pain (1 - 3)  calcium carbonate    500 mG (Tums) Chewable 1 Tablet(s) Chew every 6 hours PRN Heartburn  morphine  - Injectable 2 milliGRAM(s) IV Push every 4 hours PRN Severe Pain (7 - 10)  oxycodone    5 mG/acetaminophen 325 mG 1 Tablet(s) Oral every 6 hours PRN Moderate Pain (4 - 6)  polyethylene glycol 3350 17 Gram(s) Oral daily PRN Constipation      Allergies    No Known Allergies    Intolerances        REVIEW OF SYSTEMS:  CONSTITUTIONAL: No fever or chills  HEENT:  No headache, no sore throat  RESPIRATORY: No cough or shortness of breath  CARDIOVASCULAR: No chest pain or palpitations  GASTROINTESTINAL: No abd pain, nausea, vomiting, or diarrhea      Objective:  Vital Signs Last 24 Hrs  T(C): 36.8 (21 Dec 2024 12:56), Max: 37.4 (20 Dec 2024 19:49)  T(F): 98.3 (21 Dec 2024 12:56), Max: 99.3 (20 Dec 2024 19:49)  HR: 94 (21 Dec 2024 12:56) (94 - 107)  BP: 116/75 (21 Dec 2024 12:56) (116/75 - 130/81)  BP(mean): --  RR: 18 (21 Dec 2024 12:56) (18 - 18)  SpO2: 95% (21 Dec 2024 12:56) (95% - 96%)    Parameters below as of 21 Dec 2024 12:56  Patient On (Oxygen Delivery Method): room air        GENERAL: NAD, lying in bed comfortably  HEAD:  Normocephalic  EYES:  conjunctiva and sclera clear  ENT: Moist mucous membranes  NECK: Supple  CHEST/LUNG: Clear to auscultation bilaterally; No rales or rhonchi; no wheezing. Unlabored respirations  HEART: Regular rate and rhythm; S1S2+  ABDOMEN: Bowel sounds present; Soft, Nontender, Nondistended.   EXTREMITIES:  + distal Peripheral Pulses;  No cyanosis, or edema, left foot in ACE bandage   NERVOUS SYSTEM:  Alert & Oriented X3;  No gross focal deficits   MSK: moves all extremities  SKIN: No rashes     LABS:                        12.3   16.00 )-----------( 360      ( 21 Dec 2024 07:35 )             36.6     21 Dec 2024 07:35    138    |  101    |  11     ----------------------------<  108    3.9     |  26     |  0.62     Ca    9.5        21 Dec 2024 07:35    TPro  7.4    /  Alb  2.5    /  TBili  0.2    /  DBili  x      /  AST  73     /  ALT  109    /  AlkPhos  173    21 Dec 2024 07:35      Urinalysis Basic - ( 21 Dec 2024 07:35 )    Color: x / Appearance: x / SG: x / pH: x  Gluc: 108 mg/dL / Ketone: x  / Bili: x / Urobili: x   Blood: x / Protein: x / Nitrite: x   Leuk Esterase: x / RBC: x / WBC x   Sq Epi: x / Non Sq Epi: x / Bacteria: x      CAPILLARY BLOOD GLUCOSE            Culture - Blood (collected 12-20-24 @ 08:53)  Source: .Blood BLOOD  Preliminary Report (12-21-24 @ 13:01):    No growth at 24 hours    Culture - Blood (collected 12-20-24 @ 08:50)  Source: .Blood BLOOD  Preliminary Report (12-21-24 @ 13:01):    No growth at 24 hours    Culture - Blood (collected 12-18-24 @ 22:20)  Source: .Blood BLOOD  Gram Stain (12-20-24 @ 08:07):    Growth in anaerobic bottle: Gram Positive Cocci in Clusters  Final Report (12-21-24 @ 07:18):    Growth in anaerobic bottle: Staphylococcus aureus    See previous culture [id number]    Culture - Blood (collected 12-18-24 @ 22:10)  Source: .Blood BLOOD  Gram Stain (12-19-24 @ 20:23):    Growth in aerobic bottle: Gram Positive Cocci in Clusters  Final Report (12-20-24 @ 17:36):    Growth in aerobic bottle: Staphylococcus aureus    See previous culture [id number]    Culture - Blood (collected 12-17-24 @ 16:15)  Source: .Blood BLOOD  Gram Stain (12-18-24 @ 19:57):    Growth in anaerobic bottle: Gram Positive Cocci in Clusters    Growth in aerobic bottle: Gram Positive Cocci in Clusters  Final Report (12-20-24 @ 11:44):    Growth in aerobic and anaerobic bottles: Staphylococcus aureus    Direct identification is available within approximately 3-5    hours either by Blood Panel Multiplexed PCR or Direct    MALDI-TOF. Details: [email or web address]  Organism: Blood Culture PCR  Staphylococcus aureus (12-20-24 @ 11:44)  Organism: Staphylococcus aureus (12-20-24 @ 11:44)      Method Type: GUILHERME      -  Clindamycin: S <=0.25      -  Erythromycin: S <=0.25      -  Gentamicin: S <=4 Should not be used as monotherapy      -  Oxacillin: S <=0.25 Oxacillin predicts susceptibility for dicloxacillin, methicillin, and nafcillin      -  Rifampin: S <=1 Should not be used as monotherapy      -  Tetracycline: S <=4      -  Trimethoprim/Sulfamethoxazole: S <=0.5/9.5      -  Vancomycin: S 0.5  Organism: Blood Culture PCR (12-20-24 @ 11:44)      Method Type: PCR      -  Methicillin SENSITIVE Staphylococcus aureus (MSSA): Detec Any isolate of Staphylococcus aureus from a blood culture is NOT considered a contaminant.    Culture - Blood (collected 12-17-24 @ 16:10)  Source: .Blood BLOOD  Gram Stain (12-18-24 @ 19:36):    Growth in aerobic bottle: Gram Positive Cocci in Clusters    Growth in anaerobic bottle: Gram Positive Cocci in Clusters  Final Report (12-20-24 @ 11:45):    Growth in aerobic and anaerobic bottles: Staphylococcus aureus    See previous culture [id number]        RADIOLOGY & ADDITIONAL TESTS:    Personally reviewed.     Consultant(s) Notes Reviewed:  [x] YES  [ ] NO    Plan of care discussed with patient ; all questions answered

## 2024-12-21 NOTE — PROGRESS NOTE ADULT - SUBJECTIVE AND OBJECTIVE BOX
PROGRESS NOTE   Patient is a 54y old  Female who presents with a chief complaint of Foot pain (20 Dec 2024 11:37)      HPI:  54-year-old female who presents to the emergency department here at [facility] complaining of left foot pain.  Patient was seen here 3 days ago complaining of atraumatic left foot pain with no history of gout able to weight-bear but pain worsened with weightbearing.  No fever no chills no nausea no vomiting no diarrhea no trauma (17 Dec 2024 18:17)      Vital Signs Last 24 Hrs  T(C): 37.1 (21 Dec 2024 04:30), Max: 37.4 (20 Dec 2024 19:49)  T(F): 98.8 (21 Dec 2024 04:30), Max: 99.3 (20 Dec 2024 19:49)  HR: 102 (21 Dec 2024 04:30) (102 - 109)  BP: 118/76 (21 Dec 2024 04:30) (113/76 - 130/81)  BP(mean): --  RR: 18 (21 Dec 2024 04:30) (18 - 18)  SpO2: 95% (21 Dec 2024 04:30) (93% - 96%)    Parameters below as of 21 Dec 2024 04:30  Patient On (Oxygen Delivery Method): room air                              12.1   16.93 )-----------( 311      ( 20 Dec 2024 08:23 )             36.0               12-20    136  |  103  |  10  ----------------------------<  122[H]  3.6   |  21[L]  |  0.55    Ca    10.0      20 Dec 2024 08:23    TPro  7.7  /  Alb  2.5[L]  /  TBili  0.3  /  DBili  x   /  AST  62[H]  /  ALT  91[H]  /  AlkPhos  154[H]  12-20      PHYSICAL EXAM  General: NAD & AOX3.     Integument:  Skin warm, dry and supple bilateral.     Reduced erythema and calor noticed around the course of Posterior tibial tendon and navicular. These findings clinically consistent with cellulitis, rule out septic joint/OM.   No open wound/ulceration found bilaterally.  The region of concern is responding favorable to the current care plan.   Vascular: Dorsalis Pedis and Posterior Tibial pulses 2/4.  Capillary re-fill time less then 3 seconds digits 1-5 bilateral.  reduced edema on left foot   Neuro: Protective sensation intact to the level of the digits bilateral.   MSK: Muscle strength 5/5 all major muscle groups bilateral. Reduced pain on palpation to the medial aspect of left foot     RADIOLOGY & ADDITIONAL STUDIES:  Xrays left foot taken and reviewed : No acute pathology noted    MRI Impression:  Soft tissue infection at the ankle and foot as above with devitalized or ischemic soft tissue most prominent between the os navicularis and medial talus.  Signal abnormality within the os naviculare and adjacent navicular which could be related to osseous naviculare syndrome or osteomyelitis.  Ankle joint effusion most prominent at the anterolateral gutter with signal abnormality within the distal fibula and adjacent talus which could be related to septic arthritis or degenerative change.    Indium-111 WBC SCAN IMPRESSION:    Abnormal combined Indium-111 labeled leukocyte study and marrow scan.    Incongruent uptake in the left talus and navicular bone compatible with osteomyelitis/septic arthritis.

## 2024-12-22 PROCEDURE — 99233 SBSQ HOSP IP/OBS HIGH 50: CPT

## 2024-12-22 RX ORDER — SODIUM PHOSPHATE, DIBASIC, UNSPECIFIED FORM AND SODIUM PHOSPHATE, MONOBASIC, UNSPECIFIED FORM 7; 19 G/133ML; G/133ML
1 ENEMA RECTAL ONCE
Refills: 0 | Status: COMPLETED | OUTPATIENT
Start: 2024-12-22 | End: 2024-12-22

## 2024-12-22 RX ORDER — OXYCODONE AND ACETAMINOPHEN 5; 325 MG/1; MG/1
0.5 TABLET ORAL EVERY 6 HOURS
Refills: 0 | Status: DISCONTINUED | OUTPATIENT
Start: 2024-12-22 | End: 2024-12-24

## 2024-12-22 RX ORDER — LACTULOSE 10 G/15ML
20 SOLUTION ORAL; RECTAL DAILY
Refills: 0 | Status: DISCONTINUED | OUTPATIENT
Start: 2024-12-22 | End: 2024-12-24

## 2024-12-22 RX ORDER — POLYETHYLENE GLYCOL 3350 17 G/DOSE
17 POWDER (GRAM) ORAL
Refills: 0 | Status: DISCONTINUED | OUTPATIENT
Start: 2024-12-22 | End: 2024-12-24

## 2024-12-22 RX ADMIN — Medication 100 MILLIGRAM(S): at 06:10

## 2024-12-22 RX ADMIN — SODIUM PHOSPHATE, DIBASIC, UNSPECIFIED FORM AND SODIUM PHOSPHATE, MONOBASIC, UNSPECIFIED FORM 1 ENEMA: 7; 19 ENEMA RECTAL at 18:45

## 2024-12-22 RX ADMIN — LACTULOSE 20 GRAM(S): 10 SOLUTION ORAL; RECTAL at 11:21

## 2024-12-22 RX ADMIN — Medication 100 MILLIGRAM(S): at 21:52

## 2024-12-22 RX ADMIN — ACETAMINOPHEN 650 MILLIGRAM(S): 80 SOLUTION/ DROPS ORAL at 09:09

## 2024-12-22 RX ADMIN — SENNOSIDES 2 TABLET(S): 8.6 TABLET, FILM COATED ORAL at 21:52

## 2024-12-22 RX ADMIN — Medication 17 GRAM(S): at 00:00

## 2024-12-22 RX ADMIN — Medication 100 MILLIGRAM(S): at 14:24

## 2024-12-22 RX ADMIN — ENOXAPARIN SODIUM 40 MILLIGRAM(S): 60 INJECTION INTRAVENOUS; SUBCUTANEOUS at 14:24

## 2024-12-22 RX ADMIN — PANTOPRAZOLE 40 MILLIGRAM(S): 40 TABLET, DELAYED RELEASE ORAL at 11:21

## 2024-12-22 NOTE — PROGRESS NOTE ADULT - SUBJECTIVE AND OBJECTIVE BOX
Patient is a 54y old  Female who presents with a chief complaint of Foot pain (21 Dec 2024 14:03)      Subjective:  INTERVAL HPI/OVERNIGHT EVENTS: Patient seen and examined at bedside.  Patient has no complaints at this time.     MEDICATIONS  (STANDING):  ceFAZolin   IVPB 2000 milliGRAM(s) IV Intermittent every 8 hours  enoxaparin Injectable 40 milliGRAM(s) SubCutaneous every 24 hours  influenza   Vaccine 0.5 milliLiter(s) IntraMuscular once  pantoprazole   Suspension 40 milliGRAM(s) Oral daily  senna 2 Tablet(s) Oral at bedtime  sodium chloride 0.9%. 1000 milliLiter(s) (500 mL/Hr) IV Continuous <Continuous>    MEDICATIONS  (PRN):  acetaminophen     Tablet .. 650 milliGRAM(s) Oral every 6 hours PRN Temp greater or equal to 38C (100.4F), Mild Pain (1 - 3)  calcium carbonate    500 mG (Tums) Chewable 1 Tablet(s) Chew every 6 hours PRN Heartburn  lactulose Syrup 20 Gram(s) Oral daily PRN constipation  morphine  - Injectable 2 milliGRAM(s) IV Push every 4 hours PRN Severe Pain (7 - 10)  oxycodone    5 mG/acetaminophen 325 mG 1 Tablet(s) Oral every 6 hours PRN Moderate Pain (4 - 6)  polyethylene glycol 3350 17 Gram(s) Oral daily PRN Constipation      Allergies    No Known Allergies    Intolerances        REVIEW OF SYSTEMS:  CONSTITUTIONAL: No fever or chills  HEENT:  No headache, no sore throat  RESPIRATORY: No cough or shortness of breath  CARDIOVASCULAR: No chest pain or palpitations  GASTROINTESTINAL: No abd pain, nausea, vomiting, or diarrhea      Objective:  Vital Signs Last 24 Hrs  T(C): 36.9 (22 Dec 2024 04:45), Max: 36.9 (21 Dec 2024 20:57)  T(F): 98.5 (22 Dec 2024 04:45), Max: 98.5 (22 Dec 2024 04:45)  HR: 96 (22 Dec 2024 04:45) (92 - 96)  BP: 129/77 (22 Dec 2024 04:45) (116/72 - 129/77)  BP(mean): --  RR: 18 (22 Dec 2024 04:45) (16 - 18)  SpO2: 96% (22 Dec 2024 04:45) (95% - 98%)    Parameters below as of 22 Dec 2024 04:45  Patient On (Oxygen Delivery Method): room air        GENERAL: NAD, lying in bed comfortably  HEAD:  Normocephalic  EYES:  conjunctiva and sclera clear  ENT: Moist mucous membranes  NECK: Supple  CHEST/LUNG: Clear to auscultation bilaterally; No rales or rhonchi; no wheezing. Unlabored respirations  HEART: Regular rate and rhythm; S1S2+  ABDOMEN: Bowel sounds present; Soft, Nontender, Nondistended.   EXTREMITIES:  + distal Peripheral Pulses; Left ankle swelling /redness   NERVOUS SYSTEM:  Alert & Oriented X3;  No gross focal deficits   MSK: moves all extremities  SKIN: No rashes     LABS:      Ca    9.5        21 Dec 2024 07:35        Urinalysis Basic - ( 21 Dec 2024 07:35 )    Color: x / Appearance: x / SG: x / pH: x  Gluc: 108 mg/dL / Ketone: x  / Bili: x / Urobili: x   Blood: x / Protein: x / Nitrite: x   Leuk Esterase: x / RBC: x / WBC x   Sq Epi: x / Non Sq Epi: x / Bacteria: x      CAPILLARY BLOOD GLUCOSE            Culture - Blood (collected 12-20-24 @ 08:53)  Source: .Blood BLOOD  Preliminary Report (12-21-24 @ 13:01):    No growth at 24 hours    Culture - Blood (collected 12-20-24 @ 08:50)  Source: .Blood BLOOD  Preliminary Report (12-21-24 @ 13:01):    No growth at 24 hours    Culture - Blood (collected 12-18-24 @ 22:20)  Source: .Blood BLOOD  Gram Stain (12-20-24 @ 08:07):    Growth in anaerobic bottle: Gram Positive Cocci in Clusters  Final Report (12-21-24 @ 07:18):    Growth in anaerobic bottle: Staphylococcus aureus    See previous culture [id number]    Culture - Blood (collected 12-18-24 @ 22:10)  Source: .Blood BLOOD  Gram Stain (12-19-24 @ 20:23):    Growth in aerobic bottle: Gram Positive Cocci in Clusters  Final Report (12-20-24 @ 17:36):    Growth in aerobic bottle: Staphylococcus aureus    See previous culture [id number]    Culture - Blood (collected 12-17-24 @ 16:15)  Source: .Blood BLOOD  Gram Stain (12-18-24 @ 19:57):    Growth in anaerobic bottle: Gram Positive Cocci in Clusters    Growth in aerobic bottle: Gram Positive Cocci in Clusters  Final Report (12-20-24 @ 11:44):    Growth in aerobic and anaerobic bottles: Staphylococcus aureus    Direct identification is available within approximately 3-5    hours either by Blood Panel Multiplexed PCR or Direct    MALDI-TOF. Details: [email or web address]  Organism: Blood Culture PCR  Staphylococcus aureus (12-20-24 @ 11:44)  Organism: Staphylococcus aureus (12-20-24 @ 11:44)      Method Type: GUILHERME      -  Clindamycin: S <=0.25      -  Erythromycin: S <=0.25      -  Gentamicin: S <=4 Should not be used as monotherapy      -  Oxacillin: S <=0.25 Oxacillin predicts susceptibility for dicloxacillin, methicillin, and nafcillin      -  Rifampin: S <=1 Should not be used as monotherapy      -  Tetracycline: S <=4      -  Trimethoprim/Sulfamethoxazole: S <=0.5/9.5      -  Vancomycin: S 0.5  Organism: Blood Culture PCR (12-20-24 @ 11:44)      Method Type: PCR      -  Methicillin SENSITIVE Staphylococcus aureus (MSSA): Detec Any isolate of Staphylococcus aureus from a blood culture is NOT considered a contaminant.    Culture - Blood (collected 12-17-24 @ 16:10)  Source: .Blood BLOOD  Gram Stain (12-18-24 @ 19:36):    Growth in aerobic bottle: Gram Positive Cocci in Clusters    Growth in anaerobic bottle: Gram Positive Cocci in Clusters  Final Report (12-20-24 @ 11:45):    Growth in aerobic and anaerobic bottles: Staphylococcus aureus    See previous culture [id number]        RADIOLOGY & ADDITIONAL TESTS:    Personally reviewed.     Consultant(s) Notes Reviewed:  [x] YES  [ ] NO    Plan of care discussed with patient ; all questions answered

## 2024-12-22 NOTE — PROGRESS NOTE ADULT - ASSESSMENT
54-year-old female who presents to the emergency department here at [facility] complaining of left foot pain.  Patient was seen here 3 days ago complaining of atraumatic left foot pain with no history of gout able to weight-bear but pain worsened with weightbearing.  No fever no chills no nausea no vomiting no diarrhea no trauma      1.  Sepsis sec to Left foot infection with lactic acidosis POA Persistent MSSA bacteremia   - cw abx as per ID , now on cefepime - Ancef   - MRI: Soft tissue infection at the ankle and foot as above with devitalized or   ischemic soft tissue most prominent between the os navicularis and medial   talus.  Signal abnormality within the os naviculare and adjacent navicular which   could be related to osseous naviculare syndrome or osteomyelitis.  Ankle joint effusion most prominent at the anterolateral gutter with   signal abnormality within the distal fibula and adjacent talus which   could be related to septic arthritis or degenerative change.    -  indium scan : Abnormal combined Indium-111 labeled leukocyte study and marrow scan.Incongruent uptake in the left talus and navicular bone compatible with   osteomyelitis/septic arthritis.  - check TTE: normal EF, trace valvular disease    - pain control  -  repeat  blood cultures 12/20  NTD   - will need long term IV abx, Picc line next week, patient agreed   -discussed with ID     Constipation : add lactulose     Lovenox for DVT prophylaxis

## 2024-12-22 NOTE — PROGRESS NOTE ADULT - PROBLEM SELECTOR PLAN 1
Discussed diagnosis and treatment with patient.  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  Area of concern responding favorably to current treatment plan  Indium-111 WBC scan demonstrates incongruent uptake in the left talus and navicular bone compatible with osteomyelitis/septic arthritis.   Applied compression dressing on lower left leg.  Rec long term IV abx at this time as per ID recs.   WBC continue to trend.  Patient may need incision & drainage left foot if symptoms persist  Weight bearing as tolerated on left foot   Rec continued elevation of left lower extremity.  Medical management as per Primary Team   Pt understands that surgical intervention maybe required at a later date if symptoms not remit.  Will discuss with all attendings. Discussed diagnosis and treatment with patient.  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  Area of concern responding favorably to current treatment plan  Indium-111 WBC scan demonstrates incongruent uptake in the left talus and navicular bone compatible with osteomyelitis/septic arthritis.   Applied compression dressing on lower left leg.  Rec long term IV abx at this time as per ID recs.   WBC continue to trend.  Patient may need incision & drainage with possible bone biopsy  left foot if symptoms persist  Weight bearing as tolerated on left foot   Rec continued elevation of left lower extremity.  Medical management as per Primary Team   Pt understands that surgical intervention maybe required at a later date if symptoms not remit.  Will discuss with all attendings.

## 2024-12-22 NOTE — PROGRESS NOTE ADULT - SUBJECTIVE AND OBJECTIVE BOX
PROGRESS NOTE   Patient is a 54y old  Female who presents with a chief complaint of Foot pain (22 Dec 2024 11:51)      HPI:  54-year-old female who presents to the emergency department here at [facility] complaining of left foot pain.  Patient was seen here 3 days ago complaining of atraumatic left foot pain with no history of gout able to weight-bear but pain worsened with weightbearing.  No fever no chills no nausea no vomiting no diarrhea no trauma (17 Dec 2024 18:17)      Vital Signs Last 24 Hrs  T(C): 36.6 (22 Dec 2024 12:13), Max: 36.9 (21 Dec 2024 20:57)  T(F): 97.9 (22 Dec 2024 12:13), Max: 98.5 (22 Dec 2024 04:45)  HR: 89 (22 Dec 2024 12:13) (89 - 96)  BP: 126/78 (22 Dec 2024 12:13) (116/72 - 129/77)  BP(mean): --  RR: 18 (22 Dec 2024 12:13) (16 - 18)  SpO2: 96% (22 Dec 2024 12:13) (96% - 98%)    Parameters below as of 22 Dec 2024 12:13  Patient On (Oxygen Delivery Method): room air                              12.3   16.00 )-----------( 360      ( 21 Dec 2024 07:35 )             36.6               12-21    138  |  101  |  11  ----------------------------<  108[H]  3.9   |  26  |  0.62    Ca    9.5      21 Dec 2024 07:35    TPro  7.4  /  Alb  2.5[L]  /  TBili  0.2  /  DBili  x   /  AST  73[H]  /  ALT  109[H]  /  AlkPhos  173[H]  12-21      PHYSICAL EXAM  General: NAD & AOX3.     Integument:  Skin warm, dry and supple bilateral.     Reduced erythema and calor noticed around the course of Posterior tibial tendon and navicular. These findings clinically consistent with cellulitis, rule out septic joint/OM.   No open wound/ulceration found bilaterally.  The region of concern is responding favorable to the current care plan.   Vascular: Dorsalis Pedis and Posterior Tibial pulses 2/4.  Capillary re-fill time less then 3 seconds digits 1-5 bilateral.  reduced edema on left foot   Neuro: Protective sensation intact to the level of the digits bilateral.   MSK: Muscle strength 5/5 all major muscle groups bilateral. Reduced pain on palpation to the medial aspect of left foot   PROGRESS NOTE   Patient is a 54y old  Female who presents with a chief complaint of Foot pain (22 Dec 2024 11:51)      HPI:  54-year-old female who presents to the emergency department here at [facility] complaining of left foot pain.  Patient was seen here 3 days ago complaining of atraumatic left foot pain with no history of gout able to weight-bear but pain worsened with weightbearing.  No fever no chills no nausea no vomiting no diarrhea no trauma (17 Dec 2024 18:17)      Vital Signs Last 24 Hrs  T(C): 36.6 (22 Dec 2024 12:13), Max: 36.9 (21 Dec 2024 20:57)  T(F): 97.9 (22 Dec 2024 12:13), Max: 98.5 (22 Dec 2024 04:45)  HR: 89 (22 Dec 2024 12:13) (89 - 96)  BP: 126/78 (22 Dec 2024 12:13) (116/72 - 129/77)  BP(mean): --  RR: 18 (22 Dec 2024 12:13) (16 - 18)  SpO2: 96% (22 Dec 2024 12:13) (96% - 98%)    Parameters below as of 22 Dec 2024 12:13  Patient On (Oxygen Delivery Method): room air                              12.3   16.00 )-----------( 360      ( 21 Dec 2024 07:35 )             36.6               12-21    138  |  101  |  11  ----------------------------<  108[H]  3.9   |  26  |  0.62    Ca    9.5      21 Dec 2024 07:35    TPro  7.4  /  Alb  2.5[L]  /  TBili  0.2  /  DBili  x   /  AST  73[H]  /  ALT  109[H]  /  AlkPhos  173[H]  12-21      PHYSICAL EXAM  General: NAD & AOX3.     Integument:  Skin warm, dry and supple bilateral.     Reduced erythema and calor noticed around the course of Posterior tibial tendon and navicular. These findings clinically consistent with cellulitis, rule out septic joint/OM.   No open wound/ulceration found bilaterally.  The region of concern is responding favorable to the current care plan.  However, clinical concern for continued pain in the region of concern.  Vascular: Dorsalis Pedis and Posterior Tibial pulses 2/4.  Capillary re-fill time less then 3 seconds digits 1-5 bilateral.  reduced edema on left foot   Neuro: Protective sensation intact to the level of the digits bilateral.   MSK: Muscle strength 5/5 all major muscle groups bilateral. Reduced pain on palpation to the medial aspect of left foot

## 2024-12-23 LAB
ALBUMIN SERPL ELPH-MCNC: 2.5 G/DL — LOW (ref 3.3–5)
ALP SERPL-CCNC: 155 U/L — HIGH (ref 40–120)
ALT FLD-CCNC: 49 U/L — SIGNIFICANT CHANGE UP (ref 12–78)
ANION GAP SERPL CALC-SCNC: 9 MMOL/L — SIGNIFICANT CHANGE UP (ref 5–17)
AST SERPL-CCNC: 27 U/L — SIGNIFICANT CHANGE UP (ref 15–37)
BASOPHILS # BLD AUTO: 0.08 K/UL — SIGNIFICANT CHANGE UP (ref 0–0.2)
BASOPHILS NFR BLD AUTO: 0.6 % — SIGNIFICANT CHANGE UP (ref 0–2)
BILIRUB SERPL-MCNC: <0.1 MG/DL — LOW (ref 0.2–1.2)
BUN SERPL-MCNC: 13 MG/DL — SIGNIFICANT CHANGE UP (ref 7–23)
CALCIUM SERPL-MCNC: 9.8 MG/DL — SIGNIFICANT CHANGE UP (ref 8.5–10.1)
CHLORIDE SERPL-SCNC: 99 MMOL/L — SIGNIFICANT CHANGE UP (ref 96–108)
CO2 SERPL-SCNC: 27 MMOL/L — SIGNIFICANT CHANGE UP (ref 22–31)
CREAT SERPL-MCNC: 0.64 MG/DL — SIGNIFICANT CHANGE UP (ref 0.5–1.3)
EGFR: 106 ML/MIN/1.73M2 — SIGNIFICANT CHANGE UP
EOSINOPHIL # BLD AUTO: 0.12 K/UL — SIGNIFICANT CHANGE UP (ref 0–0.5)
EOSINOPHIL NFR BLD AUTO: 1 % — SIGNIFICANT CHANGE UP (ref 0–6)
GLUCOSE SERPL-MCNC: 138 MG/DL — HIGH (ref 70–99)
GRAM STN FLD: ABNORMAL
HCG UR QL: NEGATIVE — SIGNIFICANT CHANGE UP
HCT VFR BLD CALC: 35.8 % — SIGNIFICANT CHANGE UP (ref 34.5–45)
HGB BLD-MCNC: 11.9 G/DL — SIGNIFICANT CHANGE UP (ref 11.5–15.5)
IMM GRANULOCYTES NFR BLD AUTO: 1.8 % — HIGH (ref 0–0.9)
LYMPHOCYTES # BLD AUTO: 1.86 K/UL — SIGNIFICANT CHANGE UP (ref 1–3.3)
LYMPHOCYTES # BLD AUTO: 15 % — SIGNIFICANT CHANGE UP (ref 13–44)
MCHC RBC-ENTMCNC: 28.7 PG — SIGNIFICANT CHANGE UP (ref 27–34)
MCHC RBC-ENTMCNC: 33.2 G/DL — SIGNIFICANT CHANGE UP (ref 32–36)
MCV RBC AUTO: 86.3 FL — SIGNIFICANT CHANGE UP (ref 80–100)
MONOCYTES # BLD AUTO: 1.22 K/UL — HIGH (ref 0–0.9)
MONOCYTES NFR BLD AUTO: 9.8 % — SIGNIFICANT CHANGE UP (ref 2–14)
NEUTROPHILS # BLD AUTO: 8.9 K/UL — HIGH (ref 1.8–7.4)
NEUTROPHILS NFR BLD AUTO: 71.8 % — SIGNIFICANT CHANGE UP (ref 43–77)
NRBC # BLD: 0 /100 WBCS — SIGNIFICANT CHANGE UP (ref 0–0)
PLATELET # BLD AUTO: 432 K/UL — HIGH (ref 150–400)
POTASSIUM SERPL-MCNC: 4.2 MMOL/L — SIGNIFICANT CHANGE UP (ref 3.5–5.3)
POTASSIUM SERPL-SCNC: 4.2 MMOL/L — SIGNIFICANT CHANGE UP (ref 3.5–5.3)
PROT SERPL-MCNC: 7.7 G/DL — SIGNIFICANT CHANGE UP (ref 6–8.3)
RBC # BLD: 4.15 M/UL — SIGNIFICANT CHANGE UP (ref 3.8–5.2)
RBC # FLD: 13.4 % — SIGNIFICANT CHANGE UP (ref 10.3–14.5)
SODIUM SERPL-SCNC: 135 MMOL/L — SIGNIFICANT CHANGE UP (ref 135–145)
WBC # BLD: 12.4 K/UL — HIGH (ref 3.8–10.5)
WBC # FLD AUTO: 12.4 K/UL — HIGH (ref 3.8–10.5)

## 2024-12-23 PROCEDURE — 99233 SBSQ HOSP IP/OBS HIGH 50: CPT

## 2024-12-23 RX ADMIN — ACETAMINOPHEN 650 MILLIGRAM(S): 80 SOLUTION/ DROPS ORAL at 09:04

## 2024-12-23 RX ADMIN — ACETAMINOPHEN 650 MILLIGRAM(S): 80 SOLUTION/ DROPS ORAL at 22:54

## 2024-12-23 RX ADMIN — Medication 100 MILLIGRAM(S): at 13:51

## 2024-12-23 RX ADMIN — PANTOPRAZOLE 40 MILLIGRAM(S): 40 TABLET, DELAYED RELEASE ORAL at 11:19

## 2024-12-23 RX ADMIN — Medication 100 MILLIGRAM(S): at 22:02

## 2024-12-23 RX ADMIN — SENNOSIDES 2 TABLET(S): 8.6 TABLET, FILM COATED ORAL at 22:02

## 2024-12-23 RX ADMIN — ACETAMINOPHEN 650 MILLIGRAM(S): 80 SOLUTION/ DROPS ORAL at 01:39

## 2024-12-23 RX ADMIN — ACETAMINOPHEN 650 MILLIGRAM(S): 80 SOLUTION/ DROPS ORAL at 15:30

## 2024-12-23 RX ADMIN — ACETAMINOPHEN 650 MILLIGRAM(S): 80 SOLUTION/ DROPS ORAL at 23:54

## 2024-12-23 RX ADMIN — ENOXAPARIN SODIUM 40 MILLIGRAM(S): 60 INJECTION INTRAVENOUS; SUBCUTANEOUS at 13:51

## 2024-12-23 RX ADMIN — ACETAMINOPHEN 650 MILLIGRAM(S): 80 SOLUTION/ DROPS ORAL at 02:35

## 2024-12-23 RX ADMIN — Medication 100 MILLIGRAM(S): at 06:16

## 2024-12-23 RX ADMIN — Medication 17 GRAM(S): at 06:16

## 2024-12-23 RX ADMIN — ACETAMINOPHEN 650 MILLIGRAM(S): 80 SOLUTION/ DROPS ORAL at 09:50

## 2024-12-23 RX ADMIN — ACETAMINOPHEN 650 MILLIGRAM(S): 80 SOLUTION/ DROPS ORAL at 16:22

## 2024-12-23 NOTE — PROGRESS NOTE ADULT - PROBLEM SELECTOR PLAN 1
Discussed diagnosis and treatment with patient.  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  Area of concern responding favorably to current treatment plan  Indium-111 WBC scan demonstrates incongruent uptake in the left talus and navicular bone compatible with osteomyelitis/septic arthritis.   Applied compression dressing on lower left leg.  Rec long term IV abx at this time as per ID recs.  Picc line order placed.  WBC continue to trend.  Weight bearing as tolerated on left foot   Rec continued elevation of left lower extremity.  Medical management as per Primary Team   Pt understands that surgical intervention maybe required at a later date if symptoms not remit.  Will discuss with all attendings. Discussed diagnosis and treatment with patient.  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  Area of concern responding favorably to current treatment plan  Indium-111 WBC scan demonstrates incongruent uptake in the left talus and navicular bone compatible with osteomyelitis/septic arthritis.   Applied compression dressing on lower left leg.  Rec long term IV abx at this time as per ID recs.  Picc line order placed.  WBC continue to trend.  Weight bearing as tolerated on left foot   Rec continued elevation of left lower extremity.  Medical management as per Primary Team   OR booked for I&D on 12/24/2024  Requesting medical optimization prior to surgery  NPO after midnight  Will discuss with all attendings. Discussed diagnosis and treatment with patient.  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  Area of concern responding favorably to current treatment plan  Indium-111 WBC scan demonstrates incongruent uptake in the left talus and navicular bone compatible with osteomyelitis/septic arthritis.   Applied compression dressing on lower left leg.  Rec long term IV abx at this time as per ID recs.  Picc line order placed.  WBC continue to trend.  Weight bearing as tolerated on left foot   Rec continued elevation of left lower extremity.  Medical management as per Primary Team   discussed case with ID and agree that site control is indicated at this time. Will schedule Incision and drainage with deep soft tissue and bone cultures   discussed all treatment options with patient including non surgical and surgical options  and pt consents to the proposed care plan having had an opportunity to have all of her questions asked and answered to her satisfaction  OR booked for I&D on 12/24/2024  Requesting medical optimization prior to surgery  NPO after midnight  Will discuss with all attendings.

## 2024-12-23 NOTE — PHYSICAL THERAPY INITIAL EVALUATION ADULT - PERTINENT HX OF CURRENT PROBLEM, REHAB EVAL
54-year-old female who presents to the emergency department here at [facility] complaining of left foot pain.  Patient was seen here 3 days ago complaining of atraumatic left foot pain with no history of gout able to weight-bear but pain worsened with weightbearing.  No fever no chills no nausea no vomiting no diarrhea no trauma.

## 2024-12-23 NOTE — CASE MANAGEMENT PROGRESS NOTE - NSCMPROGRESSNOTE_GEN_ALL_CORE
Patient discussed in AM rounds, plan for IR drain of L ankle abscess tomorrow. Will need PICC line for long term IV abx. Per patient, she wants to speak with her family to confirm they can assist her at home; declined for CM to contact them. Referral initiated to [facility]. CM will follow.  Patient discussed in AM rounds, plan for OR for drain of L ankle abscess. Will need PICC line for long term IV abx. Per patient, she wants to speak with her family to confirm they can assist her at home; declined for CM to contact them. Referral initiated to [facility]. CM will follow.

## 2024-12-23 NOTE — PHYSICAL THERAPY INITIAL EVALUATION ADULT - ADDITIONAL COMMENTS
Patient reports that she lives with family in a private house, 3-4STE,bed/bath on main level. Independent with ADLs/amb PTA however has been using crutches due to foot pain.

## 2024-12-23 NOTE — PROGRESS NOTE ADULT - SUBJECTIVE AND OBJECTIVE BOX
[facility]FECTIOUS DISEASE  Goyo Reinoso MD PhD, Laurie Guerrero MD, Tahira Mills MD, Mary Barreto MD, Ronn Ann MD  and providing coverage with Roger Anderson MD  Providing Infectious Disease Consultations at [facility], [facility], [facility], [facility]'s    Office# [phone] to schedule follow up appointments  Answering Service for urgent calls or New Consults [phone]  Cell# to text for urgent issues Goyo Reinoso [phone]     infectious diseases progress note:    CHANCE AMBROSE is a 54y y. o. Female patient    Overnight and events of the last 24hrs reviewed    Allergies    No Known Allergies    Intolerances        ANTIBIOTICS/RELEVANT:  antimicrobials  ceFAZolin   IVPB 2000 milliGRAM(s) IV Intermittent every 8 hours    immunologic:  influenza   Vaccine 0.5 milliLiter(s) IntraMuscular once    OTHER:  acetaminophen     Tablet .. 650 milliGRAM(s) Oral every 6 hours PRN  calcium carbonate    500 mG (Tums) Chewable 1 Tablet(s) Chew every 6 hours PRN  enoxaparin Injectable 40 milliGRAM(s) SubCutaneous every 24 hours  lactulose Syrup 20 Gram(s) Oral daily PRN  oxycodone    5 mG/acetaminophen 325 mG 1 Tablet(s) Oral every 6 hours PRN  oxycodone    5 mG/acetaminophen 325 mG 0.5 Tablet(s) Oral every 6 hours PRN  pantoprazole   Suspension 40 milliGRAM(s) Oral daily  polyethylene glycol 3350 17 Gram(s) Oral two times a day  senna 2 Tablet(s) Oral at bedtime  sodium chloride 0.9%. 1000 milliLiter(s) IV Continuous <Continuous>      Objective:  Vital Signs Last 24 Hrs  T(C): 37.3 (23 Dec 2024 04:53), Max: 37.3 (23 Dec 2024 04:53)  T(F): 99.1 (23 Dec 2024 04:53), Max: 99.1 (23 Dec 2024 04:53)  HR: 105 (23 Dec 2024 04:53) (89 - 105)  BP: 133/88 (23 Dec 2024 04:53) (118/79 - 133/88)  BP(mean): --  RR: 18 (23 Dec 2024 04:53) (18 - 18)  SpO2: 95% (23 Dec 2024 04:53) (95% - 98%)    Parameters below as of 23 Dec 2024 04:53  Patient On (Oxygen Delivery Method): room air        T(C): 37.3 (12-23-24 @ 04:53), Max: 37.3 (12-23-24 @ 04:53)  T(C): 37.3 (12-23-24 @ 04:53), Max: 37.4 (12-20-24 @ 19:49)  T(C): 37.3 (12-23-24 @ 04:53), Max: 37.4 (12-20-24 @ 19:49)    PHYSICAL EXAM:  HEENT: NC atraumatic  Neck: supple  Respiratory: no accessory muscle use, breathing comfortably  Cardiovascular: distant  Gastrointestinal: normal appearing, nondistended  Extremities: no clubbing, no cyanosis, left ankle with concerns for collection medial aspect        LABS:                          11.9   12.40 )-----------( 432      ( 23 Dec 2024 07:20 )             35.8       WBC  12.40 12-23 @ 07:20  16.00 12-21 @ 07:35  16.93 12-20 @ 08:23  17.13 12-19 @ 10:36  19.75 12-18 @ 08:37  20.23 12-17 @ 16:10              Creatinine: 0.62 mg/dL (12-21-24 @ 07:35)  Creatinine: 0.55 mg/dL (12-20-24 @ 08:23)  Creatinine: 0.66 mg/dL (12-19-24 @ 10:36)  Creatinine: 0.63 mg/dL (12-18-24 @ 08:37)  Creatinine: 0.94 mg/dL (12-17-24 @ 16:10)                INFLAMMATORY MARKERS      MICROBIOLOGY:    Culture - Blood (12.20.24 @ 08:53)    Specimen Source: .Blood BLOOD   Culture Results:   No growth at 48 Hours        RADIOLOGY & ADDITIONAL STUDIES:  < from: TTE W or WO Ultrasound Enhancing Agent (12.21.24 @ 07:51) >    TRANSTHORACIC ECHOCARDIOGRAM REPORT  ________________________________________________________________________________                                      _______       Pt. Name:       CHANCE AMBROSE Study Date:    12/21/2024  MRN:            [id number]       YOB: 1970  Accession #:    [id number]      Age:           54 years  Account#:       [id number]     Gender:        F  Heart Rate:                    Height:        64.96 in (165.00 cm)  Rhythm:                        Weight:    178.57 lb (81.00 kg)  Blood Pressure: 118/76 mmHg    BSA/BMI:       1.88 m² / 29.75 kg/m²  ________________________________________________________________________________________  Referring Physician:    [id number] Luis Ontiveros  Interpreting Physician: Lizette Fitzpatrick MD  Primary Sonographer:    Carlie Heredia RDCS    CPT:               ECHO TTE WO CON COMP W DOPP - 99036.m  Indication(s):     Acute and subacute infective endocarditis - I33.0  Procedure:         Transthoracic echocardiogram with 2-D, M-mode and complete                     spectral and color flow Doppler.  Ordering Location: [facility]  Admission Status:  Inpatient    _______________________________________________________________________________________     CONCLUSIONS:     1. Left ventricular systolic function is normal with an ejection fraction of 60 % by Roblero's method of disks.   2. Normal right ventricular cavity size, with normal wall thickness, and normal right ventricular systolic function.   3. Tricuspid aortic valve with normal leaflet excursion.   4. Trace mitral regurgitation.   5. Trace tricuspid regurgitation.    ________________________________________________________________________________________  FINDINGS:     Left Ventricle:  Left ventricular systolic function is normal with a calculated ejection fraction of 60 % by the Roblero's biplane method of disks. There is mild (grade 1) left ventricular diastolic dysfunction.     Right Ventricle:  The right ventricular cavity is normal in size, with normal wall thickness and right ventricular systolic function is normal.     Left Atrium:  The left atrium is normal in size with an indexed volume of 19.85 ml/m².     Right Atrium:  The right atrium is normal in size.     Aortic Valve:  The aortic valve is tricuspid with normal leaflet excursion. There is no aortic valve stenosis. There is no evidence of aortic regurgitation.     Mitral Valve:  Structurally normal mitral valve with normal leaflet excursion. There is no mitral valve stenosis. There is trace mitral regurgitation.     Tricuspid Valve:  Structurally normal tricuspid valve with normal leaflet excursion. There is trace tricuspid regurgitation.     Pulmonic Valve:  Structurally normal pulmonic valve with normal leaflet excursion. There is trace pulmonic regurgitation.     Aorta:  The aortic root at the sinuses of Valsalva is normal in size. The aortic arch diameter is normal in size.     Pericardium:  No pericardial effusion seen.     Systemic Veins:  The inferior vena cava is normal in size (normal <2.1cm) with normal inspiratory collapse (normal >50%) consistent with normal right atrial pressure (~3, range 0-5mmHg).  ____________________________________________________________________  QUANTITATIVE DATA:  Left Ventricle Measurements: (Indexed to BSA)     IVSd (2D):   0.9 cm  LVPWd (2D):  1.0 cm  LVIDd (2D):  3.8 cm  LVIDs (2D):  2.7 cm  LV Mass:     116 g  61.5 g/m²  LV Vol d, MOD A2C: 78.7 ml 41.77 ml/m²  LV Vol d, MOD A4C: 68.3 ml 36.25 ml/m²  LV Vol d, MOD BP:  75.7 ml 40.20 ml/m²  LV Vol s, MOD A2C: 31.7 ml 16.82 ml/m²  LV Vol s, MOD A4C: 26.4 ml 14.01 ml/m²  LV Vol s, MOD BP:  30.4 ml 16.14 ml/m²  LVOT SV MOD BP:    45.3 ml  LV EF% MOD BP:     60 %     MV E Vmax:    0.69 m/s  MV A Vmax:    0.91 m/s  MV E/A:       0.76  e' lateral:   10.80 cm/s  e' medial:    7.07 cm/s  E/e' lateral: 6.41  E/e' medial:  9.79  E/e' Average: 7.74  MV DT:        151 msec    Aorta Measurements: (Normal range) (Indexed to BSA)     Ao Root d 3.30 cm (2.7 - 3.3 cm) 1.75 cm/m²  Ao Arch:  2.3 cm            Left Atrium Measurements: (Indexed to BSA)  LA Diam 2D: 3.30 cm         Right Ventricle Measurements:     RV Base (RVID1):  2.4 cm  RV Mid (RVID2):   2.7 cm  RV Major (RVID3): 7.3 cm    Mitral Valve Measurements:     MV E Vmax: 0.7 m/s  MV A Vmax: 0.9 m/s  MV E/A:    0.8       Tricuspid Valve Measurements:     RA Pressure: 3 mmHg    ________________________________________________________________________________________  Electronically signed on 12/21/2024 at 4:36:18 PM by Lizette Fitzpatrick MD      [facility] INFECTIOUS DISEASE  Goyo Reinoso MD PhD, Laurie Guerrero MD, Tahira Mills MD, Mary Barreto MD, Ronn Ann MD  and providing coverage with Roger Anderson MD  Providing Infectious Disease Consultations at [facility], [facility], [facility], [facility]'s    Office# [phone] to schedule follow up appointments  Answering Service for urgent calls or New Consults [phone]  Cell# to text for urgent issues Goyo Reinoso [phone]     infectious diseases progress note:    CHANCE AMBROSE is a 54y y. o. Female patient    Overnight and events of the last 24hrs reviewed    Allergies    No Known Allergies    Intolerances        ANTIBIOTICS/RELEVANT:  antimicrobials  ceFAZolin   IVPB 2000 milliGRAM(s) IV Intermittent every 8 hours    immunologic:  influenza   Vaccine 0.5 milliLiter(s) IntraMuscular once    OTHER:  acetaminophen     Tablet .. 650 milliGRAM(s) Oral every 6 hours PRN  calcium carbonate    500 mG (Tums) Chewable 1 Tablet(s) Chew every 6 hours PRN  enoxaparin Injectable 40 milliGRAM(s) SubCutaneous every 24 hours  lactulose Syrup 20 Gram(s) Oral daily PRN  oxycodone    5 mG/acetaminophen 325 mG 1 Tablet(s) Oral every 6 hours PRN  oxycodone    5 mG/acetaminophen 325 mG 0.5 Tablet(s) Oral every 6 hours PRN  pantoprazole   Suspension 40 milliGRAM(s) Oral daily  polyethylene glycol 3350 17 Gram(s) Oral two times a day  senna 2 Tablet(s) Oral at bedtime  sodium chloride 0.9%. 1000 milliLiter(s) IV Continuous <Continuous>      Objective:  Vital Signs Last 24 Hrs  T(C): 37.3 (23 Dec 2024 04:53), Max: 37.3 (23 Dec 2024 04:53)  T(F): 99.1 (23 Dec 2024 04:53), Max: 99.1 (23 Dec 2024 04:53)  HR: 105 (23 Dec 2024 04:53) (89 - 105)  BP: 133/88 (23 Dec 2024 04:53) (118/79 - 133/88)  BP(mean): --  RR: 18 (23 Dec 2024 04:53) (18 - 18)  SpO2: 95% (23 Dec 2024 04:53) (95% - 98%)    Parameters below as of 23 Dec 2024 04:53  Patient On (Oxygen Delivery Method): room air        T(C): 37.3 (12-23-24 @ 04:53), Max: 37.3 (12-23-24 @ 04:53)  T(C): 37.3 (12-23-24 @ 04:53), Max: 37.4 (12-20-24 @ 19:49)  T(C): 37.3 (12-23-24 @ 04:53), Max: 37.4 (12-20-24 @ 19:49)    PHYSICAL EXAM:  HEENT: NC atraumatic  Neck: supple  Respiratory: no accessory muscle use, breathing comfortably  Cardiovascular: distant  Gastrointestinal: normal appearing, nondistended  Extremities: no clubbing, no cyanosis, left ankle with concerns for collection medial aspect        LABS:                          11.9   12.40 )-----------( 432      ( 23 Dec 2024 07:20 )             35.8       WBC  12.40 12-23 @ 07:20  16.00 12-21 @ 07:35  16.93 12-20 @ 08:23  17.13 12-19 @ 10:36  19.75 12-18 @ 08:37  20.23 12-17 @ 16:10              Creatinine: 0.62 mg/dL (12-21-24 @ 07:35)  Creatinine: 0.55 mg/dL (12-20-24 @ 08:23)  Creatinine: 0.66 mg/dL (12-19-24 @ 10:36)  Creatinine: 0.63 mg/dL (12-18-24 @ 08:37)  Creatinine: 0.94 mg/dL (12-17-24 @ 16:10)                INFLAMMATORY MARKERS      MICROBIOLOGY:    Culture - Blood (12.20.24 @ 08:53)    Specimen Source: .Blood BLOOD   Culture Results:   No growth at 48 Hours        RADIOLOGY & ADDITIONAL STUDIES:  < from: TTE W or WO Ultrasound Enhancing Agent (12.21.24 @ 07:51) >    TRANSTHORACIC ECHOCARDIOGRAM REPORT  ________________________________________________________________________________                                      _______       Pt. Name:       CHANCE AMBROSE Study Date:    12/21/2024  MRN:            [id number]       YOB: 1970  Accession #:    [id number]      Age:           54 years  Account#:       [id number]     Gender:        F  Heart Rate:                    Height:        64.96 in (165.00 cm)  Rhythm:                        Weight:    178.57 lb (81.00 kg)  Blood Pressure: 118/76 mmHg    BSA/BMI:       1.88 m² / 29.75 kg/m²  ________________________________________________________________________________________  Referring Physician:    [id number] Luis Ontiveros  Interpreting Physician: Lizette Fitzpatrick MD  Primary Sonographer:    Carlie Heredia RDCS    CPT:               ECHO TTE WO CON COMP W DOPP - 90067.m  Indication(s):     Acute and subacute infective endocarditis - I33.0  Procedure:         Transthoracic echocardiogram with 2-D, M-mode and complete                     spectral and color flow Doppler.  Ordering Location: [facility]  Admission Status:  Inpatient    _______________________________________________________________________________________     CONCLUSIONS:     1. Left ventricular systolic function is normal with an ejection fraction of 60 % by Roblero's method of disks.   2. Normal right ventricular cavity size, with normal wall thickness, and normal right ventricular systolic function.   3. Tricuspid aortic valve with normal leaflet excursion.   4. Trace mitral regurgitation.   5. Trace tricuspid regurgitation.    ________________________________________________________________________________________  FINDINGS:     Left Ventricle:  Left ventricular systolic function is normal with a calculated ejection fraction of 60 % by the Roblero's biplane method of disks. There is mild (grade 1) left ventricular diastolic dysfunction.     Right Ventricle:  The right ventricular cavity is normal in size, with normal wall thickness and right ventricular systolic function is normal.     Left Atrium:  The left atrium is normal in size with an indexed volume of 19.85 ml/m².     Right Atrium:  The right atrium is normal in size.     Aortic Valve:  The aortic valve is tricuspid with normal leaflet excursion. There is no aortic valve stenosis. There is no evidence of aortic regurgitation.     Mitral Valve:  Structurally normal mitral valve with normal leaflet excursion. There is no mitral valve stenosis. There is trace mitral regurgitation.     Tricuspid Valve:  Structurally normal tricuspid valve with normal leaflet excursion. There is trace tricuspid regurgitation.     Pulmonic Valve:  Structurally normal pulmonic valve with normal leaflet excursion. There is trace pulmonic regurgitation.     Aorta:  The aortic root at the sinuses of Valsalva is normal in size. The aortic arch diameter is normal in size.     Pericardium:  No pericardial effusion seen.     Systemic Veins:  The inferior vena cava is normal in size (normal <2.1cm) with normal inspiratory collapse (normal >50%) consistent with normal right atrial pressure (~3, range 0-5mmHg).  ____________________________________________________________________  QUANTITATIVE DATA:  Left Ventricle Measurements: (Indexed to BSA)     IVSd (2D):   0.9 cm  LVPWd (2D):  1.0 cm  LVIDd (2D):  3.8 cm  LVIDs (2D):  2.7 cm  LV Mass:     116 g  61.5 g/m²  LV Vol d, MOD A2C: 78.7 ml 41.77 ml/m²  LV Vol d, MOD A4C: 68.3 ml 36.25 ml/m²  LV Vol d, MOD BP:  75.7 ml 40.20 ml/m²  LV Vol s, MOD A2C: 31.7 ml 16.82 ml/m²  LV Vol s, MOD A4C: 26.4 ml 14.01 ml/m²  LV Vol s, MOD BP:  30.4 ml 16.14 ml/m²  LVOT SV MOD BP:    45.3 ml  LV EF% MOD BP:     60 %     MV E Vmax:    0.69 m/s  MV A Vmax:    0.91 m/s  MV E/A:       0.76  e' lateral:   10.80 cm/s  e' medial:    7.07 cm/s  E/e' lateral: 6.41  E/e' medial:  9.79  E/e' Average: 7.74  MV DT:        151 msec    Aorta Measurements: (Normal range) (Indexed to BSA)     Ao Root d 3.30 cm (2.7 - 3.3 cm) 1.75 cm/m²  Ao Arch:  2.3 cm            Left Atrium Measurements: (Indexed to BSA)  LA Diam 2D: 3.30 cm         Right Ventricle Measurements:     RV Base (RVID1):  2.4 cm  RV Mid (RVID2):   2.7 cm  RV Major (RVID3): 7.3 cm    Mitral Valve Measurements:     MV E Vmax: 0.7 m/s  MV A Vmax: 0.9 m/s  MV E/A:    0.8       Tricuspid Valve Measurements:     RA Pressure: 3 mmHg    ________________________________________________________________________________________  Electronically signed on 12/21/2024 at 4:36:18 PM by Lizette Fitzpatrick MD     < from: NM Bone Marrow [facility] (12.20.24 @ 14:43) >    ACC: [id number] EXAM:  NM MULTI DAY PROCEDURE   ORDERED BY: GOYO REINOSO     ACC: [id number] EXAM:  NM BONE MARROW IMG LTD AREA   ORDERED BY: JALEN ONTIVEROS     ACC: [id number] EXAM:  NM INFLAMM LOC WBC SA SD   ORDERED BY: GOYO REINOSO     PROCEDURE DATE:  12/20/2024          INTERPRETATION:  RADIOPHARMACEUTICAL: 545 microcuries In-111 labeled   autologous leukocytes, I.V.; 10.8 mCi Tc99m sulfur colloid, I.V.,    CLINICAL INFORMATION: 54 year old female with atraumatic left foot   pain/redness x1 week and leukocytosis; referred to evaluate for   infection/osteomyelitis.    TECHNIQUE:  The patient was injected with the above dose of labeled   autologous leukocytes on  12/19/2024. Approximately 24 hours later, on   12/20/2024, static imagesof the lower extremities were obtained. The   patient then was injected with Tc-99m sulfur colloid and the images were   repeated in the same projections using dual isotope acquisition mode.   Images of the pelvis were obtained for  purposes.    COMPARISON: Left foot x-ray 12/17/2024. Left foot MRI 12/17/2024.    FINDINGS:    There is a focal area of intense uptake of radiolabeled leukocytes in the   medial left ankle near the region of the talus and navicular bone  that   is not incongruent with Tc-99m sulfur colloid uptake in the same region.      IMPRESSION:    Abnormal combined Indium-111 labeled leukocyte study and marrow scan.    Incongruent uptake in the left talus and navicular bone compatible with   osteomyelitis/septic arthritis.    --- End of Report ---          CHANTE ARMENDARIZ DO; Resident Radiologist  This document has been electronically signed.  SCHUYLER RUIZ MD; Attending Nuclear Medicine  This document has been electronically signed. Dec 20 2024  3:43PM    < end of copied text >

## 2024-12-23 NOTE — PROGRESS NOTE ADULT - SUBJECTIVE AND OBJECTIVE BOX
Patient is a 54y old  Female who presents with a chief complaint of Foot pain (23 Dec 2024 09:58)      Subjective:  INTERVAL HPI/OVERNIGHT EVENTS: Patient seen and examined at bedside.  Patient Left ankle pain upon ambulating     MEDICATIONS  (STANDING):  ceFAZolin   IVPB 2000 milliGRAM(s) IV Intermittent every 8 hours  enoxaparin Injectable 40 milliGRAM(s) SubCutaneous every 24 hours  influenza   Vaccine 0.5 milliLiter(s) IntraMuscular once  pantoprazole   Suspension 40 milliGRAM(s) Oral daily  polyethylene glycol 3350 17 Gram(s) Oral two times a day  senna 2 Tablet(s) Oral at bedtime  sodium chloride 0.9%. 1000 milliLiter(s) (500 mL/Hr) IV Continuous <Continuous>    MEDICATIONS  (PRN):  acetaminophen     Tablet .. 650 milliGRAM(s) Oral every 6 hours PRN Temp greater or equal to 38C (100.4F), Mild Pain (1 - 3)  calcium carbonate    500 mG (Tums) Chewable 1 Tablet(s) Chew every 6 hours PRN Heartburn  lactulose Syrup 20 Gram(s) Oral daily PRN constipation  oxycodone    5 mG/acetaminophen 325 mG 1 Tablet(s) Oral every 6 hours PRN Severe Pain (7 - 10)  oxycodone    5 mG/acetaminophen 325 mG 0.5 Tablet(s) Oral every 6 hours PRN Moderate Pain (4 - 6)      Allergies    No Known Allergies    Intolerances        REVIEW OF SYSTEMS:  CONSTITUTIONAL: No fever or chills  HEENT:  No headache, no sore throat  RESPIRATORY: No cough or shortness of breath  CARDIOVASCULAR: No chest pain or palpitations  GASTROINTESTINAL: No abd pain, nausea, vomiting, or diarrhea      Objective:  Vital Signs Last 24 Hrs  T(C): 37.3 (23 Dec 2024 04:53), Max: 37.3 (23 Dec 2024 04:53)  T(F): 99.1 (23 Dec 2024 04:53), Max: 99.1 (23 Dec 2024 04:53)  HR: 105 (23 Dec 2024 04:53) (89 - 105)  BP: 133/88 (23 Dec 2024 04:53) (118/79 - 133/88)  BP(mean): --  RR: 18 (23 Dec 2024 04:53) (18 - 18)  SpO2: 95% (23 Dec 2024 04:53) (95% - 98%)    Parameters below as of 23 Dec 2024 04:53  Patient On (Oxygen Delivery Method): room air        GENERAL: NAD, lying in bed comfortably  HEAD:  Normocephalic  EYES:  conjunctiva and sclera clear  ENT: Moist mucous membranes  NECK: Supple  CHEST/LUNG: Clear to auscultation bilaterally; No rales or rhonchi; no wheezing. Unlabored respirations  HEART: Regular rate and rhythm; S1S2+  ABDOMEN: Bowel sounds present; Soft, Nontender, Nondistended.   EXTREMITIES:  + distal Peripheral Pulses;  No cyanosis, or edema, LL ankle swelling   NERVOUS SYSTEM:  Alert & Oriented X3;  No gross focal deficits   MSK: moves all extremities  SKIN: No rashes     LABS:                        11.9   12.40 )-----------( 432      ( 23 Dec 2024 07:20 )             35.8     23 Dec 2024 07:20    135    |  99     |  13     ----------------------------<  138    4.2     |  27     |  0.64     Ca    9.8        23 Dec 2024 07:20    TPro  7.7    /  Alb  2.5    /  TBili  <0.1   /  DBili  x      /  AST  27     /  ALT  49     /  AlkPhos  155    23 Dec 2024 07:20      Urinalysis Basic - ( 23 Dec 2024 07:20 )    Color: x / Appearance: x / SG: x / pH: x  Gluc: 138 mg/dL / Ketone: x  / Bili: x / Urobili: x   Blood: x / Protein: x / Nitrite: x   Leuk Esterase: x / RBC: x / WBC x   Sq Epi: x / Non Sq Epi: x / Bacteria: x      CAPILLARY BLOOD GLUCOSE            Culture - Blood (collected 12-20-24 @ 08:53)  Source: .Blood BLOOD  Preliminary Report (12-22-24 @ 13:01):    No growth at 48 Hours    Culture - Blood (collected 12-20-24 @ 08:50)  Source: .Blood BLOOD  Preliminary Report (12-22-24 @ 13:01):    No growth at 48 Hours    Culture - Blood (collected 12-18-24 @ 22:20)  Source: .Blood BLOOD  Gram Stain (12-20-24 @ 08:07):    Growth in anaerobic bottle: Gram Positive Cocci in Clusters  Final Report (12-21-24 @ 07:18):    Growth in anaerobic bottle: Staphylococcus aureus    See previous culture [id number]    Culture - Blood (collected 12-18-24 @ 22:10)  Source: .Blood BLOOD  Gram Stain (12-19-24 @ 20:23):    Growth in aerobic bottle: Gram Positive Cocci in Clusters  Final Report (12-20-24 @ 17:36):    Growth in aerobic bottle: Staphylococcus aureus    See previous culture [id number]    Culture - Blood (collected 12-17-24 @ 16:15)  Source: .Blood BLOOD  Gram Stain (12-18-24 @ 19:57):    Growth in anaerobic bottle: Gram Positive Cocci in Clusters    Growth in aerobic bottle: Gram Positive Cocci in Clusters  Final Report (12-20-24 @ 11:44):    Growth in aerobic and anaerobic bottles: Staphylococcus aureus    Direct identification is available within approximately 3-5    hours either by Blood Panel Multiplexed PCR or Direct    MALDI-TOF. Details: [email or web address]  Organism: Blood Culture PCR  Staphylococcus aureus (12-20-24 @ 11:44)  Organism: Staphylococcus aureus (12-20-24 @ 11:44)      Method Type: GUILHERME      -  Clindamycin: S <=0.25      -  Erythromycin: S <=0.25      -  Gentamicin: S <=4 Should not be used as monotherapy      -  Oxacillin: S <=0.25 Oxacillin predicts susceptibility for dicloxacillin, methicillin, and nafcillin      -  Rifampin: S <=1 Should not be used as monotherapy      -  Tetracycline: S <=4      -  Trimethoprim/Sulfamethoxazole: S <=0.5/9.5      -  Vancomycin: S 0.5  Organism: Blood Culture PCR (12-20-24 @ 11:44)      Method Type: PCR      -  Methicillin SENSITIVE Staphylococcus aureus (MSSA): Detec Any isolate of Staphylococcus aureus from a blood culture is NOT considered a contaminant.    Culture - Blood (collected 12-17-24 @ 16:10)  Source: .Blood BLOOD  Gram Stain (12-18-24 @ 19:36):    Growth in aerobic bottle: Gram Positive Cocci in Clusters    Growth in anaerobic bottle: Gram Positive Cocci in Clusters  Final Report (12-20-24 @ 11:45):    Growth in aerobic and anaerobic bottles: Staphylococcus aureus    See previous culture [id number]        RADIOLOGY & ADDITIONAL TESTS:    Personally reviewed.     Consultant(s) Notes Reviewed:  [x] YES  [ ] NO    Plan of care discussed with patient ; all questions answered

## 2024-12-23 NOTE — PHYSICAL THERAPY INITIAL EVALUATION ADULT - GAIT DEVIATIONS NOTED, PT EVAL
attempted ambulated with crutches per patient preference/request however highly unsteady./decreased jr/decreased step length

## 2024-12-23 NOTE — PROGRESS NOTE ADULT - ASSESSMENT
54-year-old female who presents to the emergency department here at [facility] complaining of left foot pain.  Patient was seen here 3 days ago complaining of atraumatic left foot pain with no history of gout able to weight-bear but pain worsened with weightbearing.  No fever no chills no nausea no vomiting no diarrhea no trauma      1.  Sepsis sec to Left foot infection with lactic acidosis POA,  MSSA bacteremia   - cw abx as per ID , now on cefepime - Ancef   - MRI: Soft tissue infection at the ankle and foot as above with devitalized or   ischemic soft tissue most prominent between the os navicularis and medial   talus.  Signal abnormality within the os naviculare and adjacent navicular which   could be related to osseous naviculare syndrome or osteomyelitis.  Ankle joint effusion most prominent at the anterolateral gutter with   signal abnormality within the distal fibula and adjacent talus which   could be related to septic arthritis or degenerative change.    -  indium scan : Abnormal combined Indium-111 labeled leukocyte study and marrow scan.Incongruent uptake in the left talus and navicular bone compatible with osteomyelitis/septic arthritis.  - TTE: normal EF, trace valvular disease    - pain control  -  repeat  blood cultures 12/20  NTD , blood culture 12/22 pending   - pain management: prn percocet   - will need long term IV abx, Picc line after podiatry I&D  -discussed with ID /podiatry   - podiatry I&D tomorrow   - Patient is medically optimized for planed podiatry procedure.     Constipation : add lactulose , enema, miralax increased to BID     Lovenox for DVT prophylaxis

## 2024-12-23 NOTE — PATIENT CHOICE NOTE. - NSPTCHOICESTATE_GEN_ALL_CORE

## 2024-12-23 NOTE — PROGRESS NOTE ADULT - SUBJECTIVE AND OBJECTIVE BOX
PROGRESS NOTE   Patient is a 54y old  Female who presents with a chief complaint of Foot pain (22 Dec 2024 13:32)      HPI:  54-year-old female who presents to the emergency department here at [facility] complaining of left foot pain.  Patient was seen here 3 days ago complaining of atraumatic left foot pain with no history of gout able to weight-bear but pain worsened with weightbearing.  No fever no chills no nausea no vomiting no diarrhea no trauma (17 Dec 2024 18:17)      Vital Signs Last 24 Hrs  T(C): 37.3 (23 Dec 2024 04:53), Max: 37.3 (23 Dec 2024 04:53)  T(F): 99.1 (23 Dec 2024 04:53), Max: 99.1 (23 Dec 2024 04:53)  HR: 105 (23 Dec 2024 04:53) (89 - 105)  BP: 133/88 (23 Dec 2024 04:53) (118/79 - 133/88)  BP(mean): --  RR: 18 (23 Dec 2024 04:53) (18 - 18)  SpO2: 95% (23 Dec 2024 04:53) (95% - 98%)    Parameters below as of 23 Dec 2024 04:53  Patient On (Oxygen Delivery Method): room air                      PHYSICAL EXAM  General: NAD & AOX3.     Integument:  Skin warm, dry and supple bilateral.     Reduced erythema and calor noticed around the course of Posterior tibial tendon and navicular. These findings clinically consistent with cellulitis, rule out septic joint/OM.   No open wound/ulceration found bilaterally.  The region of concern is responding favorable to the current care plan.  However, clinical concern for continued pain in the region of concern.  Vascular: Dorsalis Pedis and Posterior Tibial pulses 2/4.  Capillary re-fill time less then 3 seconds digits 1-5 bilateral.  reduced edema on left foot   Neuro: Protective sensation intact to the level of the digits bilateral.   MSK: Muscle strength 5/5 all major muscle groups bilateral. Reduced pain on palpation to the medial aspect of left foot

## 2024-12-23 NOTE — PROGRESS NOTE ADULT - ASSESSMENT
54-year-old female who presents to the emergency department here at [facility] complaining of left foot and ankle pain.  Patient was seen here 3 days PTA complaining of atraumatic left foot pain with no history of gout able to weight-bear but pain worsened with weightbearing.      RECOMMENDATIONS  MSSA Bacteremia with SEPSIS pt meetings SIRS criteria with  Body temperature over 100.4°F (38°C) or under 96.8°F (36°C), Heart rate greater than 90 beats per minute, and  White blood cell count greater than 12,000 per µL (12 × 10^9 per L), with concern for infectious . Only obv localization is left ankle but findings seem minimal relative to septic picture. Potential for left ankle osteomyelitis or septic arthritis but as discussed with podiatry would be very challenging technically to try to obtain fluid for cell count and micro  rec:  Ordered 3 phase bone scan left ankle/foot-in process  Culture - Blood (12.17.24 @ 16:15) -  Methicillin SENSITIVE Staphylococcus aureus (MSSA)  Culture - Blood (12.18.24 @ 22:20) - Growth in anaerobic bottle: Gram Positive Cocci in Clusters  Culture - Blood (12.20.24 @ 08:53)--No growth at 48 Hours  Culture - Blood (12.22.24 05:00 am) - NGTD    TTE- 12/21/2024 unremarkable for IE  changed abx to Cefepime 2 grams IV q12 (started 12/18) and Cefazolin started 12/19  Podiatry involved and with noted exam potential need for site control-plan for OR 12/24-Tuesday    Thank you for consulting us and involving us in the management of this most interesting and challenging case.  We will follow along in the care of this patient. Please call us at [phone] or text me directly on my cell# at [phone] with any concerns.     54-year-old female who presents to the emergency department here at [facility] complaining of left foot and ankle pain.  Patient was seen here 3 days PTA complaining of atraumatic left foot pain with no history of gout able to weight-bear but pain worsened with weightbearing.      RECOMMENDATIONS  MSSA Bacteremia, septic joint and osteomyelitis with SEPSIS pt meetings SIRS criteria with  Body temperature over 100.4°F (38°C) or under 96.8°F (36°C), Heart rate greater than 90 beats per minute, and  White blood cell count greater than 12,000 per µL (12 × 10^9 per L), with concern for infectious . Only obv localization is left ankle but findings seem minimal relative to septic picture. Potential for left ankle osteomyelitis or septic arthritis but as discussed with podiatry would be very challenging technically to try to obtain fluid for cell count and micro  rec:  RAD: 12/20/2024  (electronically signed. Dec 20 2024  3:43PM) 3 phase bone scan left ankle/foot-Abnormal combined Indium-111 labeled leukocyte study and marrow scan. Incongruent uptake in the left talus and navicular bone compatible with osteomyelitis/septic arthritis.  Culture - Blood (12.17.24 @ 16:15) -  Methicillin SENSITIVE Staphylococcus aureus (MSSA)  Culture - Blood (12.18.24 @ 22:20) - Growth in anaerobic bottle: Gram Positive Cocci in Clusters  Culture - Blood (12.20.24 @ 08:53)--No growth at 48 Hours  Culture - Blood (12.22.24 05:00 am) - NGTD    TTE- 12/21/2024 unremarkable for IE  changed abx to Cefepime 2 grams IV q12 (started 12/18) and Cefazolin started 12/19  Podiatry involved and with noted exam potential need for site control-plan for OR 12/24-Tuesday    Thank you for consulting us and involving us in the management of this most interesting and challenging case.  We will follow along in the care of this patient. Please call us at [phone] or text me directly on my cell# at [phone] with any concerns.

## 2024-12-24 ENCOUNTER — TRANSCRIPTION ENCOUNTER (OUTPATIENT)
Age: 54
End: 2024-12-24

## 2024-12-24 LAB
CULTURE RESULTS: ABNORMAL
SPECIMEN SOURCE: SIGNIFICANT CHANGE UP

## 2024-12-24 PROCEDURE — 88304 TISSUE EXAM BY PATHOLOGIST: CPT | Mod: 26

## 2024-12-24 PROCEDURE — 99233 SBSQ HOSP IP/OBS HIGH 50: CPT

## 2024-12-24 PROCEDURE — 73630 X-RAY EXAM OF FOOT: CPT | Mod: 26,LT

## 2024-12-24 PROCEDURE — 88311 DECALCIFY TISSUE: CPT | Mod: 26

## 2024-12-24 RX ORDER — ENOXAPARIN SODIUM 60 MG/.6ML
40 INJECTION INTRAVENOUS; SUBCUTANEOUS EVERY 24 HOURS
Refills: 0 | Status: DISCONTINUED | OUTPATIENT
Start: 2024-12-25 | End: 2024-12-27

## 2024-12-24 RX ORDER — SODIUM CHLORIDE 9 MG/ML
1000 INJECTION, SOLUTION INTRAVENOUS
Refills: 0 | Status: DISCONTINUED | OUTPATIENT
Start: 2024-12-24 | End: 2024-12-24

## 2024-12-24 RX ORDER — LACTULOSE 10 G/15ML
20 SOLUTION ORAL; RECTAL DAILY
Refills: 0 | Status: DISCONTINUED | OUTPATIENT
Start: 2024-12-24 | End: 2024-12-27

## 2024-12-24 RX ORDER — HYDROMORPHONE HCL 4 MG
0.5 TABLET ORAL
Refills: 0 | Status: DISCONTINUED | OUTPATIENT
Start: 2024-12-24 | End: 2024-12-24

## 2024-12-24 RX ORDER — SENNOSIDES 8.6 MG/1
2 TABLET, FILM COATED ORAL AT BEDTIME
Refills: 0 | Status: DISCONTINUED | OUTPATIENT
Start: 2024-12-24 | End: 2024-12-27

## 2024-12-24 RX ORDER — ONDANSETRON 4 MG/1
4 TABLET ORAL ONCE
Refills: 0 | Status: DISCONTINUED | OUTPATIENT
Start: 2024-12-24 | End: 2024-12-24

## 2024-12-24 RX ORDER — CALCIUM CARBONATE 750 MG/1
1 TABLET, CHEWABLE ORAL EVERY 6 HOURS
Refills: 0 | Status: DISCONTINUED | OUTPATIENT
Start: 2024-12-24 | End: 2024-12-27

## 2024-12-24 RX ORDER — PANTOPRAZOLE 40 MG/1
40 TABLET, DELAYED RELEASE ORAL DAILY
Refills: 0 | Status: DISCONTINUED | OUTPATIENT
Start: 2024-12-24 | End: 2024-12-27

## 2024-12-24 RX ORDER — ACETAMINOPHEN 80 MG/.8ML
650 SOLUTION/ DROPS ORAL EVERY 6 HOURS
Refills: 0 | Status: DISCONTINUED | OUTPATIENT
Start: 2024-12-24 | End: 2024-12-27

## 2024-12-24 RX ORDER — CEFAZOLIN SODIUM 1 G
2000 VIAL (EA) INJECTION EVERY 8 HOURS
Refills: 0 | Status: DISCONTINUED | OUTPATIENT
Start: 2024-12-24 | End: 2024-12-27

## 2024-12-24 RX ORDER — POLYETHYLENE GLYCOL 3350 17 G/DOSE
17 POWDER (GRAM) ORAL
Refills: 0 | Status: DISCONTINUED | OUTPATIENT
Start: 2024-12-24 | End: 2024-12-27

## 2024-12-24 RX ADMIN — Medication 100 MILLIGRAM(S): at 21:07

## 2024-12-24 RX ADMIN — SENNOSIDES 2 TABLET(S): 8.6 TABLET, FILM COATED ORAL at 21:07

## 2024-12-24 RX ADMIN — Medication 100 MILLIGRAM(S): at 05:00

## 2024-12-24 RX ADMIN — Medication 100 MILLIGRAM(S): at 14:53

## 2024-12-24 RX ADMIN — SODIUM CHLORIDE 75 MILLILITER(S): 9 INJECTION, SOLUTION INTRAVENOUS at 12:03

## 2024-12-24 RX ADMIN — SODIUM CHLORIDE 75 MILLILITER(S): 9 INJECTION, SOLUTION INTRAVENOUS at 14:54

## 2024-12-24 RX ADMIN — Medication 17 GRAM(S): at 17:14

## 2024-12-24 RX ADMIN — Medication 0.5 MILLIGRAM(S): at 12:03

## 2024-12-24 NOTE — DIETITIAN INITIAL EVALUATION ADULT - ORAL INTAKE PTA/DIET HISTORY
Patient in OR at time of visit. RN reports "good" appetite  Patient reports having a "good" appetite and eating a balanced diet with 3 meals per day. Diet recall reveals patient may not be underconsuming protein  B: [place], Shake with apple, banana, strawberry, blueberry  L: Venezuelan vegetable, Roti, 2 Tortilla, Rice, Bean  D: Salad, Sabzi, Vegetable, Roti

## 2024-12-24 NOTE — PROGRESS NOTE ADULT - ASSESSMENT
54-year-old female who presents to the emergency department here at [facility] complaining of left foot and ankle pain.  Patient was seen here 3 days PTA complaining of atraumatic left foot pain with no history of gout able to weight-bear but pain worsened with weightbearing.      RECOMMENDATIONS  MSSA Bacteremia, septic joint and osteomyelitis with SEPSIS pt meetings SIRS criteria with  Body temperature over 100.4°F (38°C) or under 96.8°F (36°C), Heart rate greater than 90 beats per minute, and  White blood cell count greater than 12,000 per µL (12 × 10^9 per L), with concern for infectious . Only obv localization is left ankle but findings seem minimal relative to septic picture. Potential for left ankle osteomyelitis or septic arthritis but as discussed with podiatry would be very challenging technically to try to obtain fluid for cell count and micro  rec:  RAD: 12/20/2024  (electronically signed. Dec 20 2024  3:43PM) 3 phase bone scan left ankle/foot-Abnormal combined Indium-111 labeled leukocyte study and marrow scan. Incongruent uptake in the left talus and navicular bone compatible with osteomyelitis/septic arthritis.  Culture - Blood (12.17.24 @ 16:15) -  Methicillin SENSITIVE Staphylococcus aureus (MSSA)  Culture - Blood (12.18.24 @ 22:20) - Growth in anaerobic bottle: Gram Positive Cocci in Clusters  Culture - Blood (12.20.24 @ 08:53)--No growth at > 48 Hours  Culture - Blood (12.22.24 05:00 am) - NGTD    TTE- 12/21/2024 unremarkable for IE  changed abx to Cefepime 2 grams IV q12 (started 12/18) and then rec  Cefazolin 2 grams IV q8 started 12/19 with recommended 6 weeks of IV Rx from date of surgery with last day 2/3/25  weekly labs CBC,w diff, CMP faxed to [phone]  Podiatry involved and Planned Procedure:  I&D of left foot with deep soft tissue and bone cultures Scheduled Date / Time: 12/24/24 1030    Thank you for consulting us and involving us in the management of this most interesting and challenging case.  We will follow along in the care of this patient. Please call us at [phone] or text me directly on my cell# at [phone] with any concerns.

## 2024-12-24 NOTE — PROGRESS NOTE ADULT - SUBJECTIVE AND OBJECTIVE BOX
PRE-OP NOTE    Pre-Op Diagnosis:  OM left talus and navicular left foot  Planned Procedure:  I&D of left foot with deep soft tissue and bone cultures   Scheduled Date / Time: 12/24/24 1030  Indication:  non resolving deep space infection with OM left foot   Labs:                       11.9   12.40 )-----------( 432      ( 23 Dec 2024 07:20 )             35.8   12-23    135  |  99  |  13  ----------------------------<  138[H]  4.2   |  27  |  0.64    Ca    9.8      23 Dec 2024 07:20    TPro  7.7  /  Alb  2.5[L]  /  TBili  <0.1[L]  /  DBili  x   /  AST  27  /  ALT  49  /  AlkPhos  155[H]  12-23  LIVER FUNCTIONS - ( 23 Dec 2024 07:20 )  Alb: 2.5 g/dL / Pro: 7.7 g/dL / ALK PHOS: 155 U/L / ALT: 49 U/L / AST: 27 U/L / GGT: x           Vitals: Vital Signs Last 24 Hrs  T(C): 36.8 (24 Dec 2024 09:17), Max: 37.1 (24 Dec 2024 05:29)  T(F): 98.2 (24 Dec 2024 09:17), Max: 98.7 (24 Dec 2024 05:29)  HR: 103 (24 Dec 2024 09:17) (82 - 103)  BP: 130/86 (24 Dec 2024 09:17) (109/68 - 130/86)  BP(mean): --  RR: 18 (24 Dec 2024 09:17) (18 - 18)  SpO2: 95% (24 Dec 2024 09:17) (92% - 98%)    Parameters below as of 24 Dec 2024 09:17  Patient On (Oxygen Delivery Method): room air      PE:   Official CXR reading: (On Chart)  Official EKG reading: (On Chart)  Type and Cross/Screen: n/a  NPO after MN  Antibiotics:  as per floor regimen  Anesthesia evaluation (on chart)  Operative Consent (on chart)       Patient  had an opportunity to have all questions asked and answered Patient is aware of all risks, benefits, alternatives and recuperative period involved with the planned surgical procedure.  No assurances or guarantees were given to the patient.  Pt understands the severity  of her condition and that she is at risk to lose part of the foot, all of the foot or even a below knee amputation. Pt has not responded to IV ABX therapy and now requires site control .  Case was discussed with ID and they are in agreement with the proposed care plan. Pt had an opportunity to have all of her questions asked and answered to her satisfaction. pt consents to the proposed care plan at this time. pt understands that serial procedures maybe required if symptoms persist.

## 2024-12-24 NOTE — DIETITIAN INITIAL EVALUATION ADULT - OTHER INFO
"54-year-old female who presents to the emergency department here at [facility] complaining of left foot pain.  Patient was seen here 3 days ago complaining of atraumatic left foot pain with no history of gout able to weight-bear but pain worsened with weightbearing.  No fever no chills no nausea no vomiting no diarrhea no trauma" (H&P 12/17)    Patient in OR at time of visit. Spoke with RN who reports good appetite and adequate intake. In OR for I&D of L foot. Prior to NPO for surgery, diet order is DASH/TLC (Sodium & Cholesterol Restriction). Per flow sheet, only documented intake was 51-74% on 12/23. L knee/R ankle +1 edema. Ht 5'5'' (165.1cm). Admission wt 178 lb (80.7kg). No documented BM.  "54-year-old female who presents to the emergency department here at [facility] complaining of left foot pain.  Patient was seen here 3 days ago complaining of atraumatic left foot pain with no history of gout able to weight-bear but pain worsened with weightbearing.  No fever no chills no nausea no vomiting no diarrhea no trauma" (H&P 12/17)    Spoke with patient and family at bedside. Post OP I&D foot ankle joint and TN joint with debridement of all non viable soft tissue and bone (12/24). Reports good appetite and denies difficulty chewing/swallowing. NKFA but notes following a vegan diet. Ht 5'5'' (165.1cm). Reports a usual weight of 177 lb (80kg) which is consistent with documented wt of 181 (82kg). L knee/R ankle +1 edema. Reports a regular BM 1-2x/d. No documented BM since admission.

## 2024-12-24 NOTE — PROGRESS NOTE ADULT - SUBJECTIVE AND OBJECTIVE BOX
POST OP NOTE    CHANCE AMBROSE  MRN-[id number]    Date: 12/24/2024  Surgeon: Herbert Fatima DPM   Assistants: Joel Richardson DPM resident   Procedure: I&D of foot ankle joint and TN joint with debridement of all non viable soft tissue and bone with deep soft tissue and bone cultures over packing all left foot. Excision of os tibiale externum  Pathology: bone and soft  tissue   EBL: 5ml    Patient tolerated both anesthesia and  procedures well and was transported from the operating room to the recovery room with vital signs stable and neurovascular status intact.  Patient transferred to PACU in stable condition.       PE / Post Op Check:       GEN: NAD, AAOx3, resting comfortably with pain controlled      LE Focused: CFT shows adequate perfusion b/l with no signs of ischemic compromise.  No strikethrough is appreciated on surgical dressings.  Dressings were dry, clean, and intact.  No neuromuscular deficits appreciated.

## 2024-12-24 NOTE — PROGRESS NOTE ADULT - ASSESSMENT
54-year-old female who presents to the emergency department here at [facility] complaining of left foot pain.  Patient was seen here 3 days ago complaining of atraumatic left foot pain with no history of gout able to weight-bear but pain worsened with weightbearing.  No fever no chills no nausea no vomiting no diarrhea no trauma      1.  Sepsis sec to Left foot infection with lactic acidosis POA,  MSSA bacteremia   - cw abx as per ID , now on cefepime - Ancef   - MRI: Soft tissue infection at the ankle and foot as above with devitalized or   ischemic soft tissue most prominent between the os navicularis and medial   talus.  Signal abnormality within the os naviculare and adjacent navicular which   could be related to osseous naviculare syndrome or osteomyelitis.  Ankle joint effusion most prominent at the anterolateral gutter with   signal abnormality within the distal fibula and adjacent talus which   could be related to septic arthritis or degenerative change.    -  indium scan : Abnormal combined Indium-111 labeled leukocyte study and marrow scan.Incongruent uptake in the left talus and navicular bone compatible with osteomyelitis/septic arthritis.  - TTE: normal EF, trace valvular disease    - pain control  -  repeat  blood cultures 12/20  NTD , blood culture 12/22 pending   - pain management: prn percocet   - will need long term IV abx, Picc line after podiatry I&D  -discussed with ID /podiatry   - podiatry I&D today   - Patient is medically optimized for planed podiatry procedure.     Constipation : add lactulose , enema, miralax increased to BID     Lovenox for DVT prophylaxis

## 2024-12-24 NOTE — DIETITIAN INITIAL EVALUATION ADULT - LITERATURE/VIDEOS GIVEN
Discussed the importance of and food sources containing fiber and omega-3's for weight management and heart health. Provided [place] 3 and Heart Healthy Food Label Handouts. Discussed the importance of and food sources containing fiber and omega-3's for weight management and overall health. Provided Omega 3 and Food Label Handouts.

## 2024-12-24 NOTE — PROGRESS NOTE ADULT - PROBLEM SELECTOR PLAN 1
pt is to be discharged from recovery room to the floor under the care of the hospitalist team   post operative radiographs ordered left foot  pt is to cont IV ABX as per ID  will await intra operative cultures to establish appropriate ABX regimen  pt is to be non weight bearing with use of crutches as directed with instructions  will cont to follow and discuss with all attendings

## 2024-12-24 NOTE — BRIEF OPERATIVE NOTE - NSICDXBRIEFPREOP_GEN_ALL_CORE_FT
PRE-OP DIAGNOSIS:  Acute osteomyelitis 24-Dec-2024 11:46:38  Herbert Fatima  Cellulitis of foot 24-Dec-2024 11:47:01  Herbert Fatima

## 2024-12-24 NOTE — DIETITIAN INITIAL EVALUATION ADULT - ENERGY INTAKE
RN reports good appetite and adequate intake at meals Adequate (%) Patient reports good appetite and adequate intake at meals. Per flow sheet, only documented intake was 51-74% on 12/23.

## 2024-12-24 NOTE — PROGRESS NOTE ADULT - SUBJECTIVE AND OBJECTIVE BOX
[facility] DIVISION of INFECTIOUS DISEASE  Goyo Reinoso MD PhD, Laurie Guerrero MD, Tahira Mills MD, Mary Barreto MD, Ronn Ann MD  and providing coverage with Roger Anderson MD  Providing Infectious Disease Consultations at [facility], [facility], [facility], [facility]'s    Office# [phone] to schedule follow up appointments  Answering Service for urgent calls or New Consults [phone]  Cell# to text for urgent issues Goyo Reinoso [phone]     infectious diseases progress note:    CHANCE AMBROSE is a 54y y. o. Female patient    Overnight and events of the last 24hrs reviewed    Allergies    No Known Allergies    Intolerances        ANTIBIOTICS/RELEVANT:  antimicrobials  ceFAZolin   IVPB 2000 milliGRAM(s) IV Intermittent every 8 hours    immunologic:  influenza   Vaccine 0.5 milliLiter(s) IntraMuscular once    OTHER:  acetaminophen     Tablet .. 650 milliGRAM(s) Oral every 6 hours PRN  calcium carbonate    500 mG (Tums) Chewable 1 Tablet(s) Chew every 6 hours PRN  enoxaparin Injectable 40 milliGRAM(s) SubCutaneous every 24 hours  lactulose Syrup 20 Gram(s) Oral daily PRN  oxycodone    5 mG/acetaminophen 325 mG 1 Tablet(s) Oral every 6 hours PRN  oxycodone    5 mG/acetaminophen 325 mG 0.5 Tablet(s) Oral every 6 hours PRN  pantoprazole   Suspension 40 milliGRAM(s) Oral daily  polyethylene glycol 3350 17 Gram(s) Oral two times a day  senna 2 Tablet(s) Oral at bedtime  sodium chloride 0.9%. 1000 milliLiter(s) IV Continuous <Continuous>      Objective:  Vital Signs Last 24 Hrs  T(C): 36.7 (24 Dec 2024 10:18), Max: 37.1 (24 Dec 2024 05:29)  T(F): 98.1 (24 Dec 2024 10:18), Max: 98.7 (24 Dec 2024 05:29)  HR: 107 (24 Dec 2024 10:18) (82 - 107)  BP: 126/85 (24 Dec 2024 10:18) (109/68 - 130/86)  BP(mean): --  RR: 16 (24 Dec 2024 10:18) (16 - 18)  SpO2: 96% (24 Dec 2024 10:18) (92% - 98%)    Parameters below as of 24 Dec 2024 10:18  Patient On (Oxygen Delivery Method): room air        T(C): 36.7 (12-24-24 @ 10:18), Max: 37.3 (12-23-24 @ 04:53)  T(C): 36.7 (12-24-24 @ 10:18), Max: 37.3 (12-23-24 @ 04:53)  T(C): 36.7 (12-24-24 @ 10:18), Max: 37.4 (12-20-24 @ 19:49)    PHYSICAL EXAM:  HEENT: NC atraumatic  Neck: supple  Respiratory: no accessory muscle use, breathing comfortably  Cardiovascular: distant  Gastrointestinal: normal appearing, nondistended  Extremities: no clubbing, no cyanosis,        LABS:                          11.9   12.40 )-----------( 432      ( 23 Dec 2024 07:20 )             35.8       WBC  12.40 12-23 @ 07:20  16.00 12-21 @ 07:35  16.93 12-20 @ 08:23  17.13 12-19 @ 10:36  19.75 12-18 @ 08:37  20.23 12-17 @ 16:10      12-23    135  |  99  |  13  ----------------------------<  138[H]  4.2   |  27  |  0.64    Ca    9.8      23 Dec 2024 07:20    TPro  7.7  /  Alb  2.5[L]  /  TBili  <0.1[L]  /  DBili  x   /  AST  27  /  ALT  49  /  AlkPhos  155[H]  12-23      Creatinine: 0.64 mg/dL (12-23-24 @ 07:20)  Creatinine: 0.62 mg/dL (12-21-24 @ 07:35)  Creatinine: 0.55 mg/dL (12-20-24 @ 08:23)  Creatinine: 0.66 mg/dL (12-19-24 @ 10:36)  Creatinine: 0.63 mg/dL (12-18-24 @ 08:37)  Creatinine: 0.94 mg/dL (12-17-24 @ 16:10)        Urinalysis Basic - ( 23 Dec 2024 07:20 )    Color: x / Appearance: x / SG: x / pH: x  Gluc: 138 mg/dL / Ketone: x  / Bili: x / Urobili: x   Blood: x / Protein: x / Nitrite: x   Leuk Esterase: x / RBC: x / WBC x   Sq Epi: x / Non Sq Epi: x / Bacteria: x            INFLAMMATORY MARKERS      MICROBIOLOGY:              RADIOLOGY & ADDITIONAL STUDIES:

## 2024-12-24 NOTE — BRIEF OPERATIVE NOTE - NSICDXBRIEFPROCEDURE_GEN_ALL_CORE_FT
PROCEDURES:  Incision and drainage, bone abscess or osteomyelitis, foot 24-Dec-2024 11:45:42  Herbert Fatima  Arthrotomy of left ankle 24-Dec-2024 11:46:04  Herbert Fatima  Surgical removal of navicular bone of foot 24-Dec-2024 11:46:25  Herbert Fatima

## 2024-12-24 NOTE — DIETITIAN INITIAL EVALUATION ADULT - PERTINENT LABORATORY DATA
12-23    135  |  99  |  13  ----------------------------<  138[H]  4.2   |  27  |  0.64    Ca    9.8      23 Dec 2024 07:20    TPro  7.7  /  Alb  2.5[L]  /  TBili  <0.1[L]  /  DBili  x   /  AST  27  /  ALT  49  /  AlkPhos  155[H]  12-23

## 2024-12-24 NOTE — DIETITIAN INITIAL EVALUATION ADULT - ADD RECOMMEND
1. Advance diet to DASH/TLC (Sodium & Cholesterol Restricted Diet) when medically feasible  2. Obtain and honor food preferences as able.  3. RD to remain available and follow up as needed.  1. Contiinue DASH/TLC (Sodium & Cholesterol Restricted Diet)  2. Obtain and honor food preferences as able.  3. Encourage 3 balanced meals per day with adequate fiber and omega 3 fatty acids to promote weight management.  4. RD to remain available and follow up as needed.    1. Continue DASH/TLC (Sodium & Cholesterol Restricted Diet)  2. Obtain and honor food preferences as able.  3. Encourage 3 balanced meals per day with adequate fiber and omega 3 fatty acids to promote weight management.  4. RD to remain available and follow up as needed.

## 2024-12-24 NOTE — PROGRESS NOTE ADULT - SUBJECTIVE AND OBJECTIVE BOX
Patient is a 54y old  Female who presents with a chief complaint of Foot pain (24 Dec 2024 10:38)      Subjective:  INTERVAL HPI/OVERNIGHT EVENTS: Patient seen and examined at bedside.  Patient states she feels better today   MEDICATIONS  (STANDING):  influenza   Vaccine 0.5 milliLiter(s) IntraMuscular once  sodium chloride 0.9%. 1000 milliLiter(s) (500 mL/Hr) IV Continuous <Continuous>    MEDICATIONS  (PRN):      Allergies    No Known Allergies    Intolerances        REVIEW OF SYSTEMS:  CONSTITUTIONAL: No fever or chills  HEENT:  No headache, no sore throat  RESPIRATORY: No cough or shortness of breath  CARDIOVASCULAR: No chest pain or palpitations  GASTROINTESTINAL: No abd pain, nausea, vomiting, or diarrhea      Objective:  Vital Signs Last 24 Hrs  T(C): 36.7 (24 Dec 2024 10:18), Max: 37.1 (24 Dec 2024 05:29)  T(F): 98.1 (24 Dec 2024 10:18), Max: 98.7 (24 Dec 2024 05:29)  HR: 107 (24 Dec 2024 10:18) (94 - 107)  BP: 126/85 (24 Dec 2024 10:18) (116/77 - 130/86)  BP(mean): --  RR: 16 (24 Dec 2024 10:18) (16 - 18)  SpO2: 96% (24 Dec 2024 10:18) (95% - 98%)    Parameters below as of 24 Dec 2024 10:18  Patient On (Oxygen Delivery Method): room air        GENERAL: NAD, lying in bed comfortably  HEAD:  Normocephalic  EYES:  conjunctiva and sclera clear  ENT: Moist mucous membranes  NECK: Supple  CHEST/LUNG: Clear to auscultation bilaterally; No rales or rhonchi; no wheezing. Unlabored respirations  HEART: Regular rate and rhythm; S1S2+  ABDOMEN: Bowel sounds present; Soft, Nontender, Nondistended.   EXTREMITIES:  + distal Peripheral Pulses; Left ankle swelling   NERVOUS SYSTEM:  Alert & Oriented X3;  No gross focal deficits   MSK: moves all extremities  SKIN: No rashes     LABS:      Ca    9.8        23 Dec 2024 07:20        Urinalysis Basic - ( 23 Dec 2024 07:20 )    Color: x / Appearance: x / SG: x / pH: x  Gluc: 138 mg/dL / Ketone: x  / Bili: x / Urobili: x   Blood: x / Protein: x / Nitrite: x   Leuk Esterase: x / RBC: x / WBC x   Sq Epi: x / Non Sq Epi: x / Bacteria: x      CAPILLARY BLOOD GLUCOSE            Culture - Blood (collected 12-22-24 @ 11:45)  Source: .Blood BLOOD  Preliminary Report (12-23-24 @ 17:01):    No growth at 24 hours    Culture - Blood (collected 12-22-24 @ 11:43)  Source: .Blood BLOOD  Preliminary Report (12-23-24 @ 17:01):    No growth at 24 hours    Culture - Blood (collected 12-20-24 @ 08:53)  Source: .Blood BLOOD  Preliminary Report (12-23-24 @ 13:00):    No growth at 72 Hours    Culture - Blood (collected 12-20-24 @ 08:50)  Source: .Blood BLOOD  Gram Stain (12-23-24 @ 23:42):    Growth in anaerobic bottle: Gram Positive Cocci in Clusters  Preliminary Report (12-23-24 @ 23:43):    Growth in anaerobic bottle: Gram Positive Cocci in Clusters    Culture - Blood (collected 12-18-24 @ 22:20)  Source: .Blood BLOOD  Gram Stain (12-20-24 @ 08:07):    Growth in anaerobic bottle: Gram Positive Cocci in Clusters  Final Report (12-21-24 @ 07:18):    Growth in anaerobic bottle: Staphylococcus aureus    See previous culture [id number]    Culture - Blood (collected 12-18-24 @ 22:10)  Source: .Blood BLOOD  Gram Stain (12-19-24 @ 20:23):    Growth in aerobic bottle: Gram Positive Cocci in Clusters  Final Report (12-20-24 @ 17:36):    Growth in aerobic bottle: Staphylococcus aureus    See previous culture [id number]    Culture - Blood (collected 12-17-24 @ 16:15)  Source: .Blood BLOOD  Gram Stain (12-18-24 @ 19:57):    Growth in anaerobic bottle: Gram Positive Cocci in Clusters    Growth in aerobic bottle: Gram Positive Cocci in Clusters  Final Report (12-20-24 @ 11:44):    Growth in aerobic and anaerobic bottles: Staphylococcus aureus    Direct identification is available within approximately 3-5    hours either by Blood Panel Multiplexed PCR or Direct    MALDI-TOF. Details: [email or web address]  Organism: Blood Culture PCR  Staphylococcus aureus (12-20-24 @ 11:44)  Organism: Staphylococcus aureus (12-20-24 @ 11:44)      Method Type: GUILHERME      -  Clindamycin: S <=0.25      -  Erythromycin: S <=0.25      -  Gentamicin: S <=4 Should not be used as monotherapy      -  Oxacillin: S <=0.25 Oxacillin predicts susceptibility for dicloxacillin, methicillin, and nafcillin      -  Rifampin: S <=1 Should not be used as monotherapy      -  Tetracycline: S <=4      -  Trimethoprim/Sulfamethoxazole: S <=0.5/9.5      -  Vancomycin: S 0.5  Organism: Blood Culture PCR (12-20-24 @ 11:44)      Method Type: PCR      -  Methicillin SENSITIVE Staphylococcus aureus (MSSA): Detec Any isolate of Staphylococcus aureus from a blood culture is NOT considered a contaminant.    Culture - Blood (collected 12-17-24 @ 16:10)  Source: .Blood BLOOD  Gram Stain (12-18-24 @ 19:36):    Growth in aerobic bottle: Gram Positive Cocci in Clusters    Growth in anaerobic bottle: Gram Positive Cocci in Clusters  Final Report (12-20-24 @ 11:45):    Growth in aerobic and anaerobic bottles: Staphylococcus aureus    See previous culture [id number]        RADIOLOGY & ADDITIONAL TESTS:    Personally reviewed.     Consultant(s) Notes Reviewed:  [x] YES  [ ] NO    Plan of care discussed with patient,  all questions answered

## 2024-12-24 NOTE — DIETITIAN INITIAL EVALUATION ADULT - NUTRITIONGOAL OUTCOME1
Patient to comply with rx therapeutic diet recommendations to facilitate gradual weight loss until BMI is in a more desirable range.

## 2024-12-24 NOTE — DIETITIAN INITIAL EVALUATION ADULT - NS FNS DIET ORDER
NPO   Prior to NPO Status: DASH/TLC (Sodium & Cholesterol) restriction  DASH/TLC (Sodium & Cholesterol restriction)

## 2024-12-24 NOTE — DIETITIAN INITIAL EVALUATION ADULT - PERSON TAUGHT/METHOD
Provided Omega 3 handout and Heart Healthy Food Label Handout/verbal instruction/written material/patient instructed/spouse instructed/significant other instructed/son instructed/support person

## 2024-12-24 NOTE — PROGRESS NOTE ADULT - PROBLEM SELECTOR PLAN 1
Pt is to be medically optimized by hospitalist team  Pt is to be brought to the OR for planned procedures  pt will be admitted under the hospitalist service upon discharge from the recovery room  will await intra operative cultures to establish appropriate ABX regimen  Pt is to be followed by Podiatry and ID with hospitalist team  will cont to follow and discuss with all attendings

## 2024-12-24 NOTE — DIETITIAN INITIAL EVALUATION ADULT - PERTINENT MEDS FT
MEDICATIONS  (STANDING):  influenza   Vaccine 0.5 milliLiter(s) IntraMuscular once  sodium chloride 0.9%. 1000 milliLiter(s) (500 mL/Hr) IV Continuous <Continuous>    MEDICATIONS  (PRN):

## 2024-12-25 ENCOUNTER — TRANSCRIPTION ENCOUNTER (OUTPATIENT)
Age: 54
End: 2024-12-25

## 2024-12-25 LAB
ALBUMIN SERPL ELPH-MCNC: 2.7 G/DL — LOW (ref 3.3–5)
ALP SERPL-CCNC: 137 U/L — HIGH (ref 40–120)
ALT FLD-CCNC: 29 U/L — SIGNIFICANT CHANGE UP (ref 12–78)
ANION GAP SERPL CALC-SCNC: 8 MMOL/L — SIGNIFICANT CHANGE UP (ref 5–17)
AST SERPL-CCNC: 15 U/L — SIGNIFICANT CHANGE UP (ref 15–37)
BILIRUB SERPL-MCNC: 0.1 MG/DL — LOW (ref 0.2–1.2)
BUN SERPL-MCNC: 11 MG/DL — SIGNIFICANT CHANGE UP (ref 7–23)
CALCIUM SERPL-MCNC: 10 MG/DL — SIGNIFICANT CHANGE UP (ref 8.5–10.1)
CHLORIDE SERPL-SCNC: 99 MMOL/L — SIGNIFICANT CHANGE UP (ref 96–108)
CO2 SERPL-SCNC: 28 MMOL/L — SIGNIFICANT CHANGE UP (ref 22–31)
CREAT SERPL-MCNC: 0.69 MG/DL — SIGNIFICANT CHANGE UP (ref 0.5–1.3)
CULTURE RESULTS: SIGNIFICANT CHANGE UP
EGFR: 104 ML/MIN/1.73M2 — SIGNIFICANT CHANGE UP
GLUCOSE SERPL-MCNC: 198 MG/DL — HIGH (ref 70–99)
GRAM STN FLD: ABNORMAL
GRAM STN FLD: SIGNIFICANT CHANGE UP
HCT VFR BLD CALC: 36.5 % — SIGNIFICANT CHANGE UP (ref 34.5–45)
HGB BLD-MCNC: 12 G/DL — SIGNIFICANT CHANGE UP (ref 11.5–15.5)
MCHC RBC-ENTMCNC: 28.7 PG — SIGNIFICANT CHANGE UP (ref 27–34)
MCHC RBC-ENTMCNC: 32.9 G/DL — SIGNIFICANT CHANGE UP (ref 32–36)
MCV RBC AUTO: 87.3 FL — SIGNIFICANT CHANGE UP (ref 80–100)
NIGHT BLUE STAIN TISS: SIGNIFICANT CHANGE UP
NRBC # BLD: 0 /100 WBCS — SIGNIFICANT CHANGE UP (ref 0–0)
PLATELET # BLD AUTO: 543 K/UL — HIGH (ref 150–400)
POTASSIUM SERPL-MCNC: 3.8 MMOL/L — SIGNIFICANT CHANGE UP (ref 3.5–5.3)
POTASSIUM SERPL-SCNC: 3.8 MMOL/L — SIGNIFICANT CHANGE UP (ref 3.5–5.3)
PROT SERPL-MCNC: 8.1 G/DL — SIGNIFICANT CHANGE UP (ref 6–8.3)
RBC # BLD: 4.18 M/UL — SIGNIFICANT CHANGE UP (ref 3.8–5.2)
RBC # FLD: 13.5 % — SIGNIFICANT CHANGE UP (ref 10.3–14.5)
SODIUM SERPL-SCNC: 135 MMOL/L — SIGNIFICANT CHANGE UP (ref 135–145)
SPECIMEN SOURCE: SIGNIFICANT CHANGE UP
WBC # BLD: 18.17 K/UL — HIGH (ref 3.8–10.5)
WBC # FLD AUTO: 18.17 K/UL — HIGH (ref 3.8–10.5)

## 2024-12-25 PROCEDURE — 99233 SBSQ HOSP IP/OBS HIGH 50: CPT

## 2024-12-25 RX ADMIN — PANTOPRAZOLE 40 MILLIGRAM(S): 40 TABLET, DELAYED RELEASE ORAL at 11:38

## 2024-12-25 RX ADMIN — Medication 100 MILLIGRAM(S): at 21:05

## 2024-12-25 RX ADMIN — Medication 17 GRAM(S): at 05:25

## 2024-12-25 RX ADMIN — Medication 100 MILLIGRAM(S): at 05:25

## 2024-12-25 RX ADMIN — Medication 17 GRAM(S): at 17:47

## 2024-12-25 RX ADMIN — Medication 100 MILLIGRAM(S): at 13:59

## 2024-12-25 RX ADMIN — SENNOSIDES 2 TABLET(S): 8.6 TABLET, FILM COATED ORAL at 21:05

## 2024-12-25 RX ADMIN — ENOXAPARIN SODIUM 40 MILLIGRAM(S): 60 INJECTION INTRAVENOUS; SUBCUTANEOUS at 13:59

## 2024-12-25 NOTE — PROGRESS NOTE ADULT - ASSESSMENT
54-year-old female who presents to the emergency department here at [facility] complaining of left foot and ankle pain.  Patient was seen here 3 days PTA complaining of atraumatic left foot pain with no history of gout able to weight-bear but pain worsened with weightbearing.      RECOMMENDATIONS  MSSA Bacteremia, septic joint and osteomyelitis with SEPSIS pt meetings SIRS criteria with  Body temperature over 100.4°F (38°C) or under 96.8°F (36°C), Heart rate greater than 90 beats per minute, and  White blood cell count greater than 12,000 per µL (12 × 10^9 per L), with concern for infectious . Only obv localization is left ankle but findings seem minimal relative to septic picture. Potential for left ankle osteomyelitis or septic arthritis but as discussed with podiatry would be very challenging technically to try to obtain fluid for cell count and micro  rec:  RAD: 12/20/2024  (electronically signed. Dec 20 2024  3:43PM) 3 phase bone scan left ankle/foot-Abnormal combined Indium-111 labeled leukocyte study and marrow scan. Incongruent uptake in the left talus and navicular bone compatible with osteomyelitis/septic arthritis.  Culture - Blood (12.17.24 @ 16:15) -  Methicillin SENSITIVE Staphylococcus aureus (MSSA)  Culture - Blood (12.18.24 @ 22:20) - Growth in anaerobic bottle: Gram Positive Cocci in Clusters  Culture - Blood (12.20.24 @ 08:53)--No growth at > 48 Hours  Culture - Blood (12.22.24 05:00 am) - NGTD  Incision and drainage, bone abscess or osteomyelitis, foot 24-Dec-2024 11:45:42  Herbert Fatima  Arthrotomy of left ankle 24-Dec-2024 11:46:04  Herbert Fatima  Surgical removal of navicular bone of foot 24-Dec-2024 11:46:25  Herbert Fatima  TTE- 12/21/2024 unremarkable for IE  changed abx to Cefepime 2 grams IV q12 (started 12/18) and then rec    Cefazolin 2 grams IV q8 started 12/19 with recommended 6 weeks of IV Rx from date of surgery with last day 2/3/25  weekly labs CBC,w diff, CMP faxed to [phone]  fine to place PICC    Thank you for consulting us and involving us in the management of this most interesting and challenging case.  We will follow along in the care of this patient. Please call us at [phone] or text me directly on my cell# at [phone] with any concerns.

## 2024-12-25 NOTE — PROVIDER CONTACT NOTE (CRITICAL VALUE NOTIFICATION) - PERSON GIVING RESULT:
Josephine Adame
[facility]
 Tony
Dimal from lab
Judit from lab
[facility]

## 2024-12-25 NOTE — DISCHARGE NOTE PROVIDER - NSDCCAREPROVSEEN_GEN_ALL_CORE_FT
Kemar, Goyo Fatima, Herbert Quinn, Shahid Kemar, Goyo Fatima, Herbert Quinn, Shahid Alexandra, Nella

## 2024-12-25 NOTE — PROGRESS NOTE ADULT - SUBJECTIVE AND OBJECTIVE BOX
PROGRESS NOTE   Patient is a 54y old  Female who presents with a chief complaint of Foot pain (25 Dec 2024 12:10)      HPI:  54-year-old female who presents to the emergency department here at [facility] complaining of left foot pain.  Patient was seen here 3 days ago complaining of atraumatic left foot pain with no history of gout able to weight-bear but pain worsened with weightbearing.  No fever no chills no nausea no vomiting no diarrhea no trauma (17 Dec 2024 18:17)      Vital Signs Last 24 Hrs  T(C): 36.7 (25 Dec 2024 11:57), Max: 37 (25 Dec 2024 05:01)  T(F): 98 (25 Dec 2024 11:57), Max: 98.6 (25 Dec 2024 05:01)  HR: 95 (25 Dec 2024 11:57) (94 - 108)  BP: 109/71 (25 Dec 2024 11:57) (109/71 - 133/86)  BP(mean): --  RR: 18 (25 Dec 2024 11:57) (18 - 18)  SpO2: 99% (25 Dec 2024 11:57) (94% - 99%)    Parameters below as of 25 Dec 2024 11:57  Patient On (Oxygen Delivery Method): room air                              12.0   18.17 )-----------( 543      ( 25 Dec 2024 10:15 )             36.5               12-25    135  |  99  |  11  ----------------------------<  198[H]  3.8   |  28  |  0.69    Ca    10.0      25 Dec 2024 10:15    TPro  8.1  /  Alb  2.7[L]  /  TBili  0.1[L]  /  DBili  x   /  AST  15  /  ALT  29  /  AlkPhos  137[H]  12-25      PHYSICAL EXAM  General: NAD & AOX3.     Integument:  Skin warm, dry and supple bilateral.     s/p  I&D of foot ankle joint and TN joint with debridement of all non viable soft tissue and bone with deep soft tissue and bone cultures over packing all left foot. Excision of os tibiale externum.   Vascular: Dorsalis Pedis and Posterior Tibial pulses 2/4.  Capillary re-fill time less then 3 seconds digits 1-5 bilateral.  reduced edema on left foot   Neuro: Protective sensation intact to the level of the digits bilateral.   MSK: Muscle strength 5/5 all major muscle groups bilateral. Reduced pain on palpation to the medial aspect of left foot

## 2024-12-25 NOTE — DISCHARGE NOTE PROVIDER - ATTENDING DISCHARGE PHYSICAL EXAMINATION:
GENERAL: NAD, lying in bed comfortably  HEAD:  Normocephalic  EYES:  conjunctiva and sclera clear  ENT: Moist mucous membranes  NECK: Supple  CHEST/LUNG: Clear to auscultation bilaterally; No rales or rhonchi; no wheezing. Unlabored respirations  HEART: Regular rate and rhythm; S1S2+  ABDOMEN: Bowel sounds present; Soft, Nontender, Nondistended.   EXTREMITIES:  + distal Peripheral Pulses; left ankle in clean dressing   NERVOUS SYSTEM:  Alert & Oriented X3;  No gross focal deficits   MSK: moves all extremities  SKIN: No rashes

## 2024-12-25 NOTE — PROGRESS NOTE ADULT - SUBJECTIVE AND OBJECTIVE BOX
[facility] DIVISION of INFECTIOUS DISEASE  Goyo Reinoso MD PhD, Laurie Guerrero MD, Tahira Mills MD, Mary Barreto MD, Ronn Ann MD  and providing coverage with Roger Anderson MD  Providing Infectious Disease Consultations at [facility], [facility], [facility], [facility]'s    Office# [phone] to schedule follow up appointments  Answering Service for urgent calls or New Consults [phone]  Cell# to text for urgent issues Goyo Reinoso [phone]     infectious diseases progress note:    CHANCE AMBROSE is a 54y y. o. Female patient    Overnight and events of the last 24hrs reviewed    Allergies    No Known Allergies    Intolerances        ANTIBIOTICS/RELEVANT:  antimicrobials  ceFAZolin   IVPB 2000 milliGRAM(s) IV Intermittent every 8 hours    immunologic:  influenza   Vaccine 0.5 milliLiter(s) IntraMuscular once    OTHER:  acetaminophen     Tablet .. 650 milliGRAM(s) Oral every 6 hours PRN  calcium carbonate    500 mG (Tums) Chewable 1 Tablet(s) Chew every 6 hours PRN  enoxaparin Injectable 40 milliGRAM(s) SubCutaneous every 24 hours  lactulose Syrup 20 Gram(s) Oral daily PRN  oxycodone    5 mG/acetaminophen 325 mG 1 Tablet(s) Oral every 6 hours PRN  oxycodone    5 mG/acetaminophen 325 mG 0.5 Tablet(s) Oral every 6 hours PRN  pantoprazole   Suspension 40 milliGRAM(s) Oral daily  polyethylene glycol 3350 17 Gram(s) Oral two times a day  senna 2 Tablet(s) Oral at bedtime  sodium chloride 0.9%. 1000 milliLiter(s) IV Continuous <Continuous>      Objective:  Vital Signs Last 24 Hrs  T(C): 37 (25 Dec 2024 05:01), Max: 37 (25 Dec 2024 05:01)  T(F): 98.6 (25 Dec 2024 05:01), Max: 98.6 (25 Dec 2024 05:01)  HR: 94 (25 Dec 2024 05:01) (92 - 108)  BP: 133/86 (25 Dec 2024 05:01) (114/84 - 133/86)  BP(mean): --  RR: 18 (25 Dec 2024 05:01) (14 - 18)  SpO2: 97% (25 Dec 2024 05:01) (94% - 100%)    Parameters below as of 25 Dec 2024 05:01  Patient On (Oxygen Delivery Method): room air        T(C): 37 (12-25-24 @ 05:01), Max: 37.1 (12-24-24 @ 05:29)  T(C): 37 (12-25-24 @ 05:01), Max: 37.3 (12-23-24 @ 04:53)  T(C): 37 (12-25-24 @ 05:01), Max: 37.3 (12-23-24 @ 04:53)    PHYSICAL EXAM:  HEENT: NC atraumatic  Neck: supple  Respiratory: no accessory muscle use, breathing comfortably  Cardiovascular: distant  Gastrointestinal: normal appearing, nondistended  Extremities: no clubbing, no cyanosis,        LABS:                          12.0   18.17 )-----------( 543      ( 25 Dec 2024 10:15 )             36.5       WBC  18.17 12-25 @ 10:15  12.40 12-23 @ 07:20  16.00 12-21 @ 07:35  16.93 12-20 @ 08:23  17.13 12-19 @ 10:36      12-25    135  |  99  |  11  ----------------------------<  198[H]  3.8   |  28  |  0.69    Ca    10.0      25 Dec 2024 10:15    TPro  8.1  /  Alb  2.7[L]  /  TBili  0.1[L]  /  DBili  x   /  AST  15  /  ALT  29  /  AlkPhos  137[H]  12-25      Creatinine: 0.69 mg/dL (12-25-24 @ 10:15)  Creatinine: 0.64 mg/dL (12-23-24 @ 07:20)  Creatinine: 0.62 mg/dL (12-21-24 @ 07:35)  Creatinine: 0.55 mg/dL (12-20-24 @ 08:23)  Creatinine: 0.66 mg/dL (12-19-24 @ 10:36)        Urinalysis Basic - ( 25 Dec 2024 10:15 )    Color: x / Appearance: x / SG: x / pH: x  Gluc: 198 mg/dL / Ketone: x  / Bili: x / Urobili: x   Blood: x / Protein: x / Nitrite: x   Leuk Esterase: x / RBC: x / WBC x   Sq Epi: x / Non Sq Epi: x / Bacteria: x            INFLAMMATORY MARKERS      MICROBIOLOGY:              RADIOLOGY & ADDITIONAL STUDIES:

## 2024-12-25 NOTE — PROGRESS NOTE ADULT - PROBLEM SELECTOR PLAN 1
Discussed diagnosis and treatment with patient.  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  Area of concern responding favorably to current treatment plan  POS #1 s/p  I&D of foot ankle joint and TN joint with debridement of all non viable soft tissue and bone with deep soft tissue and bone cultures over packing all left foot.  Advanced packings today without incident   WBC continue to trend.  Rec continued elevation of left lower extremity.  Medical management as per Primary Team   Will discuss with all attendings.

## 2024-12-25 NOTE — PROGRESS NOTE ADULT - ASSESSMENT
54-year-old female who presents to the emergency department here at [facility] complaining of left foot pain.  Patient was seen here 3 days ago complaining of atraumatic left foot pain with no history of gout able to weight-bear but pain worsened with weightbearing.  No fever no chills no nausea no vomiting no diarrhea no trauma      1.  Sepsis sec to Left foot infection with lactic acidosis POA,  MSSA bacteremia   - cw abx as per ID , now on cefepime - Ancef   - MRI: Soft tissue infection at the ankle and foot as above with devitalized or   ischemic soft tissue most prominent between the os navicularis and medial   talus.  Signal abnormality within the os naviculare and adjacent navicular which   could be related to osseous naviculare syndrome or osteomyelitis.  Ankle joint effusion most prominent at the anterolateral gutter with   signal abnormality within the distal fibula and adjacent talus which   could be related to septic arthritis or degenerative change.    -  indium scan : Abnormal combined Indium-111 labeled leukocyte study and marrow scan.Incongruent uptake in the left talus and navicular bone compatible with osteomyelitis/septic arthritis.  - TTE: normal EF, trace valvular disease    - pain control: tylenol , percocet   -  repeat  blood cultures 12/20  NTD , blood culture 12/22 NTD    - pain management: prn percocet   - will need long term IV abx, Picc line after podiatry I&D  -discussed with ID /podiatry   - podiatry I&D to left ankle on 12/24 tissue Gram crooks one + G +  cocci  - plan for picc placement and Abx IV ancef to 2/3/25   Constipation : add lactulose , enema, miralax increased to BID     Lovenox for DVT prophylaxis      54-year-old female who presents to the emergency department here at [facility] complaining of left foot pain.  Patient was seen here 3 days ago complaining of atraumatic left foot pain with no history of gout able to weight-bear but pain worsened with weightbearing.  No fever no chills no nausea no vomiting no diarrhea no trauma      1.  Sepsis sec to Left foot infection with lactic acidosis POA,  MSSA bacteremia   - cw abx as per ID , now on cefepime - Ancef   - MRI: Soft tissue infection at the ankle and foot as above with devitalized or   ischemic soft tissue most prominent between the os navicularis and medial   talus.  Signal abnormality within the os naviculare and adjacent navicular which   could be related to osseous naviculare syndrome or osteomyelitis.  Ankle joint effusion most prominent at the anterolateral gutter with   signal abnormality within the distal fibula and adjacent talus which   could be related to septic arthritis or degenerative change.    -  indium scan : Abnormal combined Indium-111 labeled leukocyte study and marrow scan.Incongruent uptake in the left talus and navicular bone compatible with osteomyelitis/septic arthritis.  - TTE: normal EF, trace valvular disease    - pain control: tylenol , percocet   -  repeat  blood cultures 12/20  NTD , blood culture 12/22 NTD    - pain management: prn percocet   - will need long term IV abx, Picc line after podiatry I&D  -discussed with ID /podiatry   - podiatry I&D to left ankle on 12/24 tissue Gram stain 1/2  G +  cocci  - bone pathology pending   - plan for picc placement and Abx IV ancef to 2/3/25   Constipation : add lactulose , enema, miralax increased to BID     Lovenox for DVT prophylaxis

## 2024-12-25 NOTE — DISCHARGE NOTE PROVIDER - NSDCCPCAREPLAN_GEN_ALL_CORE_FT
PRINCIPAL DISCHARGE DIAGNOSIS  Diagnosis: Cellulitis of left foot  Assessment and Plan of Treatment: Sepsis sec to Left foot infection with lactic acidosis POA,  MSSA bacteremia   - MRI: Soft tissue infection at the ankle and foot as above with devitalized or ischemic soft tissue most prominent between the os navicularis and medial talus.  Signal abnormality within the os naviculare and adjacent navicular which could be related to osseous naviculare syndrome or osteomyelitis. Ankle joint effusion most prominent at the anterolateral gutter with signal abnormality within the distal fibula and adjacent talus which could be related to septic arthritis or degenerative change.  - indium scan : Abnormal combined Indium-111 labeled leukocyte study and marrow scan.Incongruent uptake in the left talus and navicular bone compatible with osteomyelitis/septic arthritis.  - TTE: normal EF, trace valvular disease    -  repeat  blood cultures 12/20  NTD , blood culture 12/22 NTD    - pain management: prn percocet  - podiatry I&D to left ankle on 12/24   - picc placement and Abx IV ancef to 2/3/25      SECONDARY DISCHARGE DIAGNOSES  Diagnosis: Leukocytosis  Assessment and Plan of Treatment:      PRINCIPAL DISCHARGE DIAGNOSIS  Diagnosis: Cellulitis of left foot  Assessment and Plan of Treatment: Sepsis sec to Left foot infection with lactic acidosis POA,  MSSA bacteremia   - MRI: Soft tissue infection at the ankle and foot as above with devitalized or ischemic soft tissue most prominent between the os navicularis and medial talus.  Signal abnormality within the os naviculare and adjacent navicular which could be related to osseous naviculare syndrome or osteomyelitis. Ankle joint effusion most prominent at the anterolateral gutter with signal abnormality within the distal fibula and adjacent talus which could be related to septic arthritis or degenerative change.  - indium scan : Abnormal combined Indium-111 labeled leukocyte study and marrow scan.Incongruent uptake in the left talus and navicular bone compatible with osteomyelitis/septic arthritis.  - TTE: normal EF, trace valvular disease    -  repeat  blood cultures 12/20  NTD , blood culture 12/22 NTD    - pain management: prn percocet  - podiatry I&D to left ankle on 12/24   - picc placement Cefazolin 2 grams IV q8 started 12/19 with recommended 6 weeks of IV Rx from date of surgery with last day 2/3/25  weekly labs CBC,w diff, CMP faxed to [phone]     PRINCIPAL DISCHARGE DIAGNOSIS  Diagnosis: Cellulitis of left foot  Assessment and Plan of Treatment: Sepsis sec to Left foot infection with lactic acidosis POA,  MSSA bacteremia   - MRI: Soft tissue infection at the ankle and foot as above with devitalized or ischemic soft tissue most prominent between the os navicularis and medial talus.  Signal abnormality within the os naviculare and adjacent navicular which could be related to osseous naviculare syndrome or osteomyelitis. Ankle joint effusion most prominent at the anterolateral gutter with signal abnormality within the distal fibula and adjacent talus which could be related to septic arthritis or degenerative change.  - indium scan : Abnormal combined Indium-111 labeled leukocyte study and marrow scan.Incongruent uptake in the left talus and navicular bone compatible with osteomyelitis/septic arthritis.  - TTE: normal EF, trace valvular disease    -  repeat  blood cultures 12/20  NTD , blood culture 12/22 NTD    - pain management: prn percocet  - podiatry I&D to left ankle on 12/24   - picc placement Cefazolin 2 grams IV q8 started 12/19 with recommended 6 weeks of IV Rx from date of surgery with last day 2/3/25  weekly labs CBC,w diff, CMP faxed to [phone]  continue to use walker     PRINCIPAL DISCHARGE DIAGNOSIS  Diagnosis: Cellulitis of left foot  Assessment and Plan of Treatment: you were admitted for sepsis due to Left foot infection. You were seen by podiatry and had an I&D to left ankle on 12/24. You had a long term iv placed for antibiotics.  Continue Cefazolin 2 grams IV q8 started 12/19 with recommended 6 weeks of IV Rx from date of surgery with last day 2/3/25. Have weekly labs CBC,w diff, CMP faxed to [phone]. Continue to use walker when ambulating. Follow up with Dr. Herbert Fatima 2-3 days after discharge. Please call [phone] for any question  Dressings instructions : please keep it clean/dry/intact until you are seen by dr. fatima

## 2024-12-25 NOTE — PROVIDER CONTACT NOTE (CRITICAL VALUE NOTIFICATION) - ACTION/TREATMENT ORDERED:
Dr. Carson notified. NNO at this time.
Dr Carson notified. NNO at this time.
MD notified
Mariana Moscoso NP notified of above, no new orders at this time.

## 2024-12-25 NOTE — PROGRESS NOTE ADULT - SUBJECTIVE AND OBJECTIVE BOX
Patient is a 54y old  Female who presents with a chief complaint of Foot pain (25 Dec 2024 11:15)      Subjective:  INTERVAL HPI/OVERNIGHT EVENTS: Patient seen and examined at bedside.  Patient states her ankle pain is better     MEDICATIONS  (STANDING):  ceFAZolin   IVPB 2000 milliGRAM(s) IV Intermittent every 8 hours  enoxaparin Injectable 40 milliGRAM(s) SubCutaneous every 24 hours  influenza   Vaccine 0.5 milliLiter(s) IntraMuscular once  pantoprazole   Suspension 40 milliGRAM(s) Oral daily  polyethylene glycol 3350 17 Gram(s) Oral two times a day  senna 2 Tablet(s) Oral at bedtime  sodium chloride 0.9%. 1000 milliLiter(s) (500 mL/Hr) IV Continuous <Continuous>    MEDICATIONS  (PRN):  acetaminophen     Tablet .. 650 milliGRAM(s) Oral every 6 hours PRN Temp greater or equal to 38C (100.4F), Mild Pain (1 - 3)  calcium carbonate    500 mG (Tums) Chewable 1 Tablet(s) Chew every 6 hours PRN Heartburn  lactulose Syrup 20 Gram(s) Oral daily PRN constipation  oxycodone    5 mG/acetaminophen 325 mG 1 Tablet(s) Oral every 6 hours PRN Severe Pain (7 - 10)  oxycodone    5 mG/acetaminophen 325 mG 0.5 Tablet(s) Oral every 6 hours PRN Moderate Pain (4 - 6)      Allergies    No Known Allergies    Intolerances        REVIEW OF SYSTEMS:  CONSTITUTIONAL: No fever or chills  HEENT:  No headache, no sore throat  RESPIRATORY: No cough or shortness of breath  CARDIOVASCULAR: No chest pain or palpitations  GASTROINTESTINAL: No abd pain, nausea, vomiting, or diarrhea      Objective:  Vital Signs Last 24 Hrs  T(C): 36.7 (25 Dec 2024 11:57), Max: 37 (25 Dec 2024 05:01)  T(F): 98 (25 Dec 2024 11:57), Max: 98.6 (25 Dec 2024 05:01)  HR: 95 (25 Dec 2024 11:57) (92 - 108)  BP: 109/71 (25 Dec 2024 11:57) (109/71 - 133/86)  BP(mean): --  RR: 18 (25 Dec 2024 11:57) (14 - 18)  SpO2: 99% (25 Dec 2024 11:57) (94% - 99%)    Parameters below as of 25 Dec 2024 11:57  Patient On (Oxygen Delivery Method): room air        GENERAL: NAD, lying in bed comfortably  HEAD:  Normocephalic  EYES:  conjunctiva and sclera clear  ENT: Moist mucous membranes  NECK: Supple  CHEST/LUNG: Clear to auscultation bilaterally; No rales or rhonchi; no wheezing. Unlabored respirations  HEART: Regular rate and rhythm; S1S2+  ABDOMEN: Bowel sounds present; Soft, Nontender, Nondistended.   EXTREMITIES:  + distal Peripheral Pulses; left ankle in clean dressing   NERVOUS SYSTEM:  Alert & Oriented X3;  No gross focal deficits   MSK: moves all extremities  SKIN: No rashes     LABS:                        12.0   18.17 )-----------( 543      ( 25 Dec 2024 10:15 )             36.5     25 Dec 2024 10:15    135    |  99     |  11     ----------------------------<  198    3.8     |  28     |  0.69     Ca    10.0       25 Dec 2024 10:15    TPro  8.1    /  Alb  2.7    /  TBili  0.1    /  DBili  x      /  AST  15     /  ALT  29     /  AlkPhos  137    25 Dec 2024 10:15      Urinalysis Basic - ( 25 Dec 2024 10:15 )    Color: x / Appearance: x / SG: x / pH: x  Gluc: 198 mg/dL / Ketone: x  / Bili: x / Urobili: x   Blood: x / Protein: x / Nitrite: x   Leuk Esterase: x / RBC: x / WBC x   Sq Epi: x / Non Sq Epi: x / Bacteria: x      CAPILLARY BLOOD GLUCOSE            Culture - Tissue with Gram Stain (collected 12-24-24 @ 11:20)  Source: Tissue  Gram Stain (12-25-24 @ 06:28):    No polymorphonuclear cells seen per low power field    Few Gram Positive Cocci in Clusters seen per oil power field    Culture - Tissue with Gram Stain (collected 12-24-24 @ 11:17)  Source: Tissue  Gram Stain (12-25-24 @ 06:34):    No polymorphonuclear cells seen per low power field    No organisms seen per oil power field    Culture - Blood (collected 12-22-24 @ 11:45)  Source: .Blood BLOOD  Preliminary Report (12-24-24 @ 17:01):    No growth at 48 Hours    Culture - Blood (collected 12-22-24 @ 11:43)  Source: .Blood BLOOD  Preliminary Report (12-24-24 @ 17:01):    No growth at 48 Hours    Culture - Blood (collected 12-20-24 @ 08:53)  Source: .Blood BLOOD  Preliminary Report (12-24-24 @ 13:01):    No growth at 4 days    Culture - Blood (collected 12-20-24 @ 08:50)  Source: .Blood BLOOD  Gram Stain (12-23-24 @ 23:42):    Growth in anaerobic bottle: Gram Positive Cocci in Clusters  Final Report (12-24-24 @ 18:19):    Growth in anaerobic bottle: Staphylococcus aureus    See previous culture [id number]    Culture - Blood (collected 12-18-24 @ 22:20)  Source: .Blood BLOOD  Gram Stain (12-20-24 @ 08:07):    Growth in anaerobic bottle: Gram Positive Cocci in Clusters  Final Report (12-21-24 @ 07:18):    Growth in anaerobic bottle: Staphylococcus aureus    See previous culture [id number]    Culture - Blood (collected 12-18-24 @ 22:10)  Source: .Blood BLOOD  Gram Stain (12-19-24 @ 20:23):    Growth in aerobic bottle: Gram Positive Cocci in Clusters  Final Report (12-20-24 @ 17:36):    Growth in aerobic bottle: Staphylococcus aureus    See previous culture [id number]        RADIOLOGY & ADDITIONAL TESTS:    Personally reviewed.     Consultant(s) Notes Reviewed:  [x] YES  [ ] NO    Plan of care discussed with patient; all questions answered

## 2024-12-25 NOTE — DISCHARGE NOTE PROVIDER - NSDCMRMEDTOKEN_GEN_ALL_CORE_FT
indomethacin 50 mg oral capsule: 1 cap(s) orally every 8 hours  predniSONE 20 mg oral tablet: 2 tab(s) orally once a day   ceFAZolin 2 g intravenous injection: 2 gram(s) intravenous every 8 hours  pantoprazole 40 mg oral delayed release tablet: 1 tab(s) orally once a day  polyethylene glycol 3350 oral powder for reconstitution: 17 gram(s) orally 2 times a day  senna leaf extract oral tablet: 2 tab(s) orally once a day (at bedtime)

## 2024-12-25 NOTE — DISCHARGE NOTE PROVIDER - CARE PROVIDER_API CALL
Herbert Fatima  Podiatric Medicine and Surgery  [address] [place] [address]  Phone: [phone]  Fax: [phone]  Follow Up Time: 1 week    Goyo Reinoso  Infectious Disease  [address] [place] [id number]  Phone: [phone]  Fax: [phone]  Follow Up Time: 2 weeks

## 2024-12-25 NOTE — PROVIDER CONTACT NOTE (CRITICAL VALUE NOTIFICATION) - SITUATION
Blood culture- 12/18- growth in aerobic bottle cocci in clusters prelim
Received call from lab, results for patient blood cultures on 12/17- growth in aerobic bottle gram positive cocci in clusters.  12/17- growth in anaerobic bottle gram positive cocci in clusters
Tissue culture collected 12/24/24 Gram stain nopolymorph nuclear call seen few gram stain + cocci in cluster per oil power field

## 2024-12-25 NOTE — DISCHARGE NOTE PROVIDER - PROVIDER TOKENS
PROVIDER:[TOKEN:[[id number]],FOLLOWUP:[1 week]],PROVIDER:[TOKEN:[[id number]],FOLLOWUP:[2 weeks]]

## 2024-12-25 NOTE — DISCHARGE NOTE PROVIDER - CARE PROVIDERS DIRECT ADDRESSES
[email or web address]

## 2024-12-25 NOTE — DISCHARGE NOTE PROVIDER - HOSPITAL COURSE
ADMISSION DATE:  12-17-24    ---  FROM ADMISSION H+P:   HPI:  54-year-old female who presents to the emergency department here at [facility] complaining of left foot pain.  Patient was seen here 3 days ago complaining of atraumatic left foot pain with no history of gout able to weight-bear but pain worsened with weightbearing.  No fever no chills no nausea no vomiting no diarrhea no trauma (17 Dec 2024 18:17)      ---  HOSPITAL COURSE/PERTINENT LABS/PROCEDURES PERFORMED/PENDING TESTS:   Pt was admitted for    Sepsis sec to Left foot infection with lactic acidosis POA,  MSSA bacteremia   - MRI: Soft tissue infection at the ankle and foot as above with devitalized or ischemic soft tissue most prominent between the os navicularis and medial talus.  Signal abnormality within the os naviculare and adjacent navicular which could be related to osseous naviculare syndrome or osteomyelitis. Ankle joint effusion most prominent at the anterolateral gutter with signal abnormality within the distal fibula and adjacent talus which could be related to septic arthritis or degenerative change.  - indium scan : Abnormal combined Indium-111 labeled leukocyte study and marrow scan.Incongruent uptake in the left talus and navicular bone compatible with osteomyelitis/septic arthritis.  - TTE: normal EF, trace valvular disease    -  repeat  blood cultures 12/20  NTD , blood culture 12/22 NTD    - pain management: prn percocet  - podiatry I&D to left ankle on 12/24   - picc placement and Abx IV ancef to 2/3/25  -  bone pathology pending OR culture ------------   Constipation : add lactulose , enema, miralax increased to BID       Patient is stable for discharge as per primary medical team and consultants.    PT consulted, recommends discharge ______    Patient showed improvement throughout hospitalization. Patient was seen and examined on day of discharge. Patient was medically optimized for discharge with close outpatient follow up.    ---  PATIENT CONDITION:  - stable    --  VITALS:   T(C): 36.7 (12-25-24 @ 11:57), Max: 37 (12-25-24 @ 05:01)  HR: 95 (12-25-24 @ 11:57) (94 - 108)  BP: 109/71 (12-25-24 @ 11:57) (109/71 - 133/86)  RR: 18 (12-25-24 @ 11:57) (18 - 18)  SpO2: 99% (12-25-24 @ 11:57) (94% - 99%)    PHYSICAL EXAM ON DAY OF DISCHARGE:    ---  CONSULTANTS:   podiatry   ID        ADMISSION DATE:  12-17-24    ---  FROM ADMISSION H+P:   HPI:  54-year-old female who presents to the emergency department here at [facility] complaining of left foot pain.  Patient was seen here 3 days ago complaining of atraumatic left foot pain with no history of gout able to weight-bear but pain worsened with weightbearing.  No fever no chills no nausea no vomiting no diarrhea no trauma (17 Dec 2024 18:17)      ---  HOSPITAL COURSE/PERTINENT LABS/PROCEDURES PERFORMED/PENDING TESTS:   Pt was admitted for    Sepsis sec to Left foot infection with lactic acidosis POA,  MSSA bacteremia   - MRI: Soft tissue infection at the ankle and foot as above with devitalized or ischemic soft tissue most prominent between the os navicularis and medial talus.  Signal abnormality within the os naviculare and adjacent navicular which could be related to osseous naviculare syndrome or osteomyelitis. Ankle joint effusion most prominent at the anterolateral gutter with signal abnormality within the distal fibula and adjacent talus which could be related to septic arthritis or degenerative change.  - indium scan : Abnormal combined Indium-111 labeled leukocyte study and marrow scan. Incongruent uptake in the left talus and navicular bone compatible with osteomyelitis/septic arthritis.  - TTE: normal EF, trace valvular disease    -  repeat  blood cultures 12/20  NTD , blood culture 12/22 NTD    - pain management: prn percocet  - podiatry I&D to left ankle on 12/24   - picc placement and Abx IV ancef to 2/3/25  -  bone pathology pending OR culture  Constipation : add lactulose , enema, miralax increased to BID       Patient is stable for discharge as per primary medical team and consultants.    PT consulted, recommends discharge ______    Patient showed improvement throughout hospitalization. Patient was seen and examined on day of discharge. Patient was medically optimized for discharge with close outpatient follow up.    ---  PATIENT CONDITION:  - stable    --  VITALS:   T(C): 36.7 (12-25-24 @ 11:57), Max: 37 (12-25-24 @ 05:01)  HR: 95 (12-25-24 @ 11:57) (94 - 108)  BP: 109/71 (12-25-24 @ 11:57) (109/71 - 133/86)  RR: 18 (12-25-24 @ 11:57) (18 - 18)  SpO2: 99% (12-25-24 @ 11:57) (94% - 99%)    PHYSICAL EXAM ON DAY OF DISCHARGE:    GENERAL: NAD, lying in bed comfortably  HEAD:  Normocephalic  EYES:  conjunctiva and sclera clear  ENT: Moist mucous membranes  NECK: Supple  CHEST/LUNG: Clear to auscultation bilaterally; No rales or rhonchi; no wheezing. Unlabored respirations  HEART: Regular rate and rhythm; S1S2+  ABDOMEN: Bowel sounds present; Soft, Nontender, Nondistended.   EXTREMITIES:  + distal Peripheral Pulses; left ankle in clean dressing   NERVOUS SYSTEM:  Alert & Oriented X3;  No gross focal deficits   MSK: moves all extremities  SKIN: No rashes     ---  CONSULTANTS:   podiatry   ID        ADMISSION DATE:  12-17-24    ---  FROM ADMISSION H+P:   HPI:  54-year-old female who presents to the emergency department here at [facility] complaining of left foot pain.  Patient was seen here 3 days ago complaining of atraumatic left foot pain with no history of gout able to weight-bear but pain worsened with weightbearing.  No fever no chills no nausea no vomiting no diarrhea no trauma (17 Dec 2024 18:17)      ---  HOSPITAL COURSE/PERTINENT LABS/PROCEDURES PERFORMED/PENDING TESTS:   Pt was admitted for    Sepsis sec to Left foot infection with lactic acidosis POA,  MSSA bacteremia   - MRI: Soft tissue infection at the ankle and foot as above with devitalized or ischemic soft tissue most prominent between the os navicularis and medial talus.  Signal abnormality within the os naviculare and adjacent navicular which could be related to osseous naviculare syndrome or osteomyelitis. Ankle joint effusion most prominent at the anterolateral gutter with signal abnormality within the distal fibula and adjacent talus which could be related to septic arthritis or degenerative change.  - indium scan : Abnormal combined Indium-111 labeled leukocyte study and marrow scan. Incongruent uptake in the left talus and navicular bone compatible with osteomyelitis/septic arthritis.  - TTE: normal EF, trace valvular disease    -  repeat  blood cultures 12/20  NTD , blood culture 12/22 NTD    - pain management: prn percocet  - podiatry I&D to left ankle on 12/24   - picc placement and Abx IV ancef to 2/3/25  -  bone pathology pending OR culture  Constipation : add lactulose , enema, miralax increased to BID       Patient is stable for discharge as per primary medical team and consultants.    PT consulted, recommends discharge NWB with walker     Patient showed improvement throughout hospitalization. Patient was seen and examined on day of discharge. Patient was medically optimized for discharge with close outpatient follow up.    ---  PATIENT CONDITION:  - stable    --  VITALS:   T(C): 36.7 (12-25-24 @ 11:57), Max: 37 (12-25-24 @ 05:01)  HR: 95 (12-25-24 @ 11:57) (94 - 108)  BP: 109/71 (12-25-24 @ 11:57) (109/71 - 133/86)  RR: 18 (12-25-24 @ 11:57) (18 - 18)  SpO2: 99% (12-25-24 @ 11:57) (94% - 99%)    PHYSICAL EXAM ON DAY OF DISCHARGE:    GENERAL: NAD, lying in bed comfortably  HEAD:  Normocephalic  EYES:  conjunctiva and sclera clear  ENT: Moist mucous membranes  NECK: Supple  CHEST/LUNG: Clear to auscultation bilaterally; No rales or rhonchi; no wheezing. Unlabored respirations  HEART: Regular rate and rhythm; S1S2+  ABDOMEN: Bowel sounds present; Soft, Nontender, Nondistended.   EXTREMITIES:  + distal Peripheral Pulses; left ankle in clean dressing   NERVOUS SYSTEM:  Alert & Oriented X3;  No gross focal deficits   MSK: moves all extremities  SKIN: No rashes     ---  CONSULTANTS:   podiatry   ID        ADMISSION DATE:  12-17-24    ---  FROM ADMISSION H+P:   HPI:  54-year-old female who presents to the emergency department here at [facility] complaining of left foot pain.  Patient was seen here 3 days ago complaining of atraumatic left foot pain with no history of gout able to weight-bear but pain worsened with weightbearing.  No fever no chills no nausea no vomiting no diarrhea no trauma (17 Dec 2024 18:17)      ---  HOSPITAL COURSE/PERTINENT LABS/PROCEDURES PERFORMED/PENDING TESTS:   Pt was admitted for    Sepsis sec to Left foot infection with lactic acidosis POA,  MSSA bacteremia   - MRI: Soft tissue infection at the ankle and foot as above with devitalized or ischemic soft tissue most prominent between the os navicularis and medial talus.  Signal abnormality within the os naviculare and adjacent navicular which could be related to osseous naviculare syndrome or osteomyelitis. Ankle joint effusion most prominent at the anterolateral gutter with signal abnormality within the distal fibula and adjacent talus which could be related to septic arthritis or degenerative change.  - indium scan : Abnormal combined Indium-111 labeled leukocyte study and marrow scan. Incongruent uptake in the left talus and navicular bone compatible with osteomyelitis/septic arthritis.  - TTE: normal EF, trace valvular disease    -  repeat  blood cultures 12/20  NTD , blood culture 12/22 NTD    - pain management: prn percocet  - podiatry I&D to left ankle on 12/24   - picc placement and Abx IV ancef to 2/3/25  -  bone pathology pending OR culture  Constipation : add lactulose , enema, miralax increased to BID       Patient is stable for discharge as per primary medical team and consultants.    PT consulted, recommends discharge NWB with walker     Patient showed improvement throughout hospitalization. Patient was seen and examined on day of discharge. Patient was medically optimized for discharge with close outpatient follow up.    ---  PATIENT CONDITION:  - stable    --  VITALS:   Vital Signs Last 24 Hrs  T(C): 36.9 (27 Dec 2024 04:36), Max: 37.1 (26 Dec 2024 20:10)  T(F): 98.5 (27 Dec 2024 04:36), Max: 98.7 (26 Dec 2024 20:10)  HR: 89 (27 Dec 2024 04:36) (89 - 96)  BP: 116/81 (27 Dec 2024 04:36) (102/70 - 127/88)  BP(mean): --  RR: 18 (27 Dec 2024 04:36) (18 - 18)  SpO2: 95% (27 Dec 2024 04:36) (95% - 96%)    Parameters below as of 27 Dec 2024 04:36  Patient On (Oxygen Delivery Method): room air        PHYSICAL EXAM ON DAY OF DISCHARGE:    GENERAL: NAD, lying in bed comfortably  HEAD:  Normocephalic  EYES:  conjunctiva and sclera clear  ENT: Moist mucous membranes  NECK: Supple  CHEST/LUNG: Clear to auscultation bilaterally; No rales or rhonchi; no wheezing. Unlabored respirations  HEART: Regular rate and rhythm; S1S2+  ABDOMEN: Bowel sounds present; Soft, Nontender, Nondistended.   EXTREMITIES:  + distal Peripheral Pulses; left ankle in clean dressing   NERVOUS SYSTEM:  Alert & Oriented X3;  No gross focal deficits   MSK: moves all extremities  SKIN: No rashes     ---  CONSULTANTS:   podiatry   ID

## 2024-12-25 NOTE — PROVIDER CONTACT NOTE (CRITICAL VALUE NOTIFICATION) - TEST AND RESULT REPORTED:
12/17- growth in aerobic bottle gram positive cocci in clusters.  12/17- growth in anaerobic bottle gram positive cocci in clusters
Blood Culture natasha: 12/18/24 Preliminary growth in anaerobic bottle gram (+) cocci in clusters
Blood culture- 12/18- growth in aerobic bottle cocci in clusters prelim
lactate 2.6
Tissue culture collected 12/24/24 Fram stain nopolymorph nuclear call seen few gram stain + cocci in cluster per oil power field
Lactate 2.3

## 2024-12-26 ENCOUNTER — TRANSCRIPTION ENCOUNTER (OUTPATIENT)
Age: 54
End: 2024-12-26

## 2024-12-26 LAB
-  CLINDAMYCIN: SIGNIFICANT CHANGE UP
-  ERYTHROMYCIN: SIGNIFICANT CHANGE UP
-  GENTAMICIN: SIGNIFICANT CHANGE UP
-  OXACILLIN: SIGNIFICANT CHANGE UP
-  RIFAMPIN: SIGNIFICANT CHANGE UP
-  TETRACYCLINE: SIGNIFICANT CHANGE UP
-  TRIMETHOPRIM/SULFAMETHOXAZOLE: SIGNIFICANT CHANGE UP
-  VANCOMYCIN: SIGNIFICANT CHANGE UP
ALBUMIN SERPL ELPH-MCNC: 2.8 G/DL — LOW (ref 3.3–5)
ALP SERPL-CCNC: 137 U/L — HIGH (ref 40–120)
ALT FLD-CCNC: 26 U/L — SIGNIFICANT CHANGE UP (ref 12–78)
ANION GAP SERPL CALC-SCNC: 7 MMOL/L — SIGNIFICANT CHANGE UP (ref 5–17)
AST SERPL-CCNC: 14 U/L — LOW (ref 15–37)
BILIRUB SERPL-MCNC: 0.2 MG/DL — SIGNIFICANT CHANGE UP (ref 0.2–1.2)
BUN SERPL-MCNC: 13 MG/DL — SIGNIFICANT CHANGE UP (ref 7–23)
CALCIUM SERPL-MCNC: 10.8 MG/DL — HIGH (ref 8.5–10.1)
CHLORIDE SERPL-SCNC: 99 MMOL/L — SIGNIFICANT CHANGE UP (ref 96–108)
CO2 SERPL-SCNC: 29 MMOL/L — SIGNIFICANT CHANGE UP (ref 22–31)
CREAT SERPL-MCNC: 0.71 MG/DL — SIGNIFICANT CHANGE UP (ref 0.5–1.3)
EGFR: 102 ML/MIN/1.73M2 — SIGNIFICANT CHANGE UP
GLUCOSE SERPL-MCNC: 115 MG/DL — HIGH (ref 70–99)
GRAM STN FLD: ABNORMAL
HCT VFR BLD CALC: 38.9 % — SIGNIFICANT CHANGE UP (ref 34.5–45)
HCT VFR BLD CALC: 40 % — SIGNIFICANT CHANGE UP (ref 34.5–45)
HGB BLD-MCNC: 12.7 G/DL — SIGNIFICANT CHANGE UP (ref 11.5–15.5)
HGB BLD-MCNC: 13.2 G/DL — SIGNIFICANT CHANGE UP (ref 11.5–15.5)
MCHC RBC-ENTMCNC: 28.5 PG — SIGNIFICANT CHANGE UP (ref 27–34)
MCHC RBC-ENTMCNC: 29.1 PG — SIGNIFICANT CHANGE UP (ref 27–34)
MCHC RBC-ENTMCNC: 32.6 G/DL — SIGNIFICANT CHANGE UP (ref 32–36)
MCHC RBC-ENTMCNC: 33 G/DL — SIGNIFICANT CHANGE UP (ref 32–36)
MCV RBC AUTO: 87.4 FL — SIGNIFICANT CHANGE UP (ref 80–100)
MCV RBC AUTO: 88.3 FL — SIGNIFICANT CHANGE UP (ref 80–100)
METHOD TYPE: SIGNIFICANT CHANGE UP
NRBC # BLD: 0 /100 WBCS — SIGNIFICANT CHANGE UP (ref 0–0)
NRBC # BLD: 0 /100 WBCS — SIGNIFICANT CHANGE UP (ref 0–0)
PLATELET # BLD AUTO: 633 K/UL — HIGH (ref 150–400)
PLATELET # BLD AUTO: 660 K/UL — HIGH (ref 150–400)
POTASSIUM SERPL-MCNC: 4.2 MMOL/L — SIGNIFICANT CHANGE UP (ref 3.5–5.3)
POTASSIUM SERPL-SCNC: 4.2 MMOL/L — SIGNIFICANT CHANGE UP (ref 3.5–5.3)
PROT SERPL-MCNC: 8.2 G/DL — SIGNIFICANT CHANGE UP (ref 6–8.3)
RBC # BLD: 4.45 M/UL — SIGNIFICANT CHANGE UP (ref 3.8–5.2)
RBC # BLD: 4.53 M/UL — SIGNIFICANT CHANGE UP (ref 3.8–5.2)
RBC # FLD: 13.5 % — SIGNIFICANT CHANGE UP (ref 10.3–14.5)
RBC # FLD: 13.8 % — SIGNIFICANT CHANGE UP (ref 10.3–14.5)
SODIUM SERPL-SCNC: 135 MMOL/L — SIGNIFICANT CHANGE UP (ref 135–145)
WBC # BLD: 14.02 K/UL — HIGH (ref 3.8–10.5)
WBC # BLD: 15.37 K/UL — HIGH (ref 3.8–10.5)
WBC # FLD AUTO: 14.02 K/UL — HIGH (ref 3.8–10.5)
WBC # FLD AUTO: 15.37 K/UL — HIGH (ref 3.8–10.5)

## 2024-12-26 PROCEDURE — 99233 SBSQ HOSP IP/OBS HIGH 50: CPT

## 2024-12-26 PROCEDURE — 36573 INSJ PICC RS&I 5 YR+: CPT

## 2024-12-26 RX ORDER — PANTOPRAZOLE 40 MG/1
1 TABLET, DELAYED RELEASE ORAL
Qty: 30 | Refills: 0
Start: 2024-12-26 | End: 2025-01-24

## 2024-12-26 RX ORDER — CHLORHEXIDINE GLUCONATE 1.2 MG/ML
1 RINSE ORAL
Refills: 0 | Status: DISCONTINUED | OUTPATIENT
Start: 2024-12-26 | End: 2024-12-27

## 2024-12-26 RX ORDER — SODIUM CHLORIDE 9 MG/ML
10 INJECTION, SOLUTION INTRAMUSCULAR; INTRAVENOUS; SUBCUTANEOUS
Refills: 0 | Status: DISCONTINUED | OUTPATIENT
Start: 2024-12-26 | End: 2024-12-27

## 2024-12-26 RX ORDER — SENNOSIDES 8.6 MG/1
2 TABLET, FILM COATED ORAL
Qty: 60 | Refills: 0
Start: 2024-12-26 | End: 2025-01-24

## 2024-12-26 RX ORDER — CEFAZOLIN SODIUM 1 G
2 VIAL (EA) INJECTION
Qty: 0 | Refills: 0 | DISCHARGE
Start: 2024-12-26

## 2024-12-26 RX ORDER — POLYETHYLENE GLYCOL 3350 17 G/DOSE
17 POWDER (GRAM) ORAL
Qty: 1020 | Refills: 0
Start: 2024-12-26 | End: 2025-01-24

## 2024-12-26 RX ADMIN — Medication 100 MILLIGRAM(S): at 23:11

## 2024-12-26 RX ADMIN — ENOXAPARIN SODIUM 40 MILLIGRAM(S): 60 INJECTION INTRAVENOUS; SUBCUTANEOUS at 14:36

## 2024-12-26 RX ADMIN — Medication 100 MILLIGRAM(S): at 14:36

## 2024-12-26 RX ADMIN — PANTOPRAZOLE 40 MILLIGRAM(S): 40 TABLET, DELAYED RELEASE ORAL at 11:57

## 2024-12-26 RX ADMIN — Medication 17 GRAM(S): at 05:18

## 2024-12-26 RX ADMIN — Medication 100 MILLIGRAM(S): at 05:18

## 2024-12-26 RX ADMIN — Medication 17 GRAM(S): at 17:29

## 2024-12-26 NOTE — PROGRESS NOTE ADULT - SUBJECTIVE AND OBJECTIVE BOX
Patient is a 54y old  Female who presents with a chief complaint of Foot pain (26 Dec 2024 10:51)      INTERVAL HPI/OVERNIGHT EVENTS: Patient seen and examined at bedside. No overnight events occurred. Patient has no complaints at this time. Denies fevers, chills, headache, lightheadedness, chest pain, dyspnea, abdominal pain, n/v/d/c.    MEDICATIONS  (STANDING):  ceFAZolin   IVPB 2000 milliGRAM(s) IV Intermittent every 8 hours  enoxaparin Injectable 40 milliGRAM(s) SubCutaneous every 24 hours  influenza   Vaccine 0.5 milliLiter(s) IntraMuscular once  pantoprazole   Suspension 40 milliGRAM(s) Oral daily  polyethylene glycol 3350 17 Gram(s) Oral two times a day  senna 2 Tablet(s) Oral at bedtime  sodium chloride 0.9%. 1000 milliLiter(s) (500 mL/Hr) IV Continuous <Continuous>    MEDICATIONS  (PRN):  acetaminophen     Tablet .. 650 milliGRAM(s) Oral every 6 hours PRN Temp greater or equal to 38C (100.4F), Mild Pain (1 - 3)  calcium carbonate    500 mG (Tums) Chewable 1 Tablet(s) Chew every 6 hours PRN Heartburn  lactulose Syrup 20 Gram(s) Oral daily PRN constipation  oxycodone    5 mG/acetaminophen 325 mG 1 Tablet(s) Oral every 6 hours PRN Severe Pain (7 - 10)  oxycodone    5 mG/acetaminophen 325 mG 0.5 Tablet(s) Oral every 6 hours PRN Moderate Pain (4 - 6)      Allergies    No Known Allergies    Intolerances        REVIEW OF SYSTEMS:  CONSTITUTIONAL: No fever or chills  HEENT:  No headache, no sore throat  RESPIRATORY: No cough, wheezing, or shortness of breath  CARDIOVASCULAR: No chest pain, palpitations  GASTROINTESTINAL: No abd pain, nausea, vomiting, or diarrhea  GENITOURINARY: No dysuria, frequency, or hematuria  NEUROLOGICAL: no focal weakness or dizziness  MUSCULOSKELETAL: no myalgias     Vital Signs Last 24 Hrs  T(C): 36.4 (26 Dec 2024 11:26), Max: 36.8 (25 Dec 2024 20:41)  T(F): 97.5 (26 Dec 2024 11:26), Max: 98.2 (25 Dec 2024 20:41)  HR: 96 (26 Dec 2024 11:26) (93 - 96)  BP: 127/88 (26 Dec 2024 11:26) (104/70 - 127/88)  BP(mean): --  RR: 18 (26 Dec 2024 11:26) (18 - 18)  SpO2: 96% (26 Dec 2024 11:26) (94% - 96%)    Parameters below as of 26 Dec 2024 11:26  Patient On (Oxygen Delivery Method): room air        PHYSICAL EXAM:  GENERAL: NAD  HEENT:  anicteric, moist mucous membranes  CHEST/LUNG:  CTA b/l, no rales, wheezes, or rhonchi  HEART:  RRR, S1, S2  ABDOMEN:  BS+, soft, nontender, nondistended  EXTREMITIES: no edema, cyanosis, or calf tenderness, l ankle dressing c/d/i   NERVOUS SYSTEM: answers questions and follows commands appropriately    LABS:                        12.7   15.37 )-----------( 660      ( 26 Dec 2024 09:47 )             38.9     CBC Full  -  ( 26 Dec 2024 09:47 )  WBC Count : 15.37 K/uL  Hemoglobin : 12.7 g/dL  Hematocrit : 38.9 %  Platelet Count - Automated : 660 K/uL  Mean Cell Volume : 87.4 fl  Mean Cell Hemoglobin : 28.5 pg  Mean Cell Hemoglobin Concentration : 32.6 g/dL  Auto Neutrophil # : x  Auto Lymphocyte # : x  Auto Monocyte # : x  Auto Eosinophil # : x  Auto Basophil # : x  Auto Neutrophil % : x  Auto Lymphocyte % : x  Auto Monocyte % : x  Auto Eosinophil % : x  Auto Basophil % : x    26 Dec 2024 06:35    135    |  99     |  13     ----------------------------<  115    4.2     |  29     |  0.71     Ca    10.8       26 Dec 2024 06:35    TPro  8.2    /  Alb  2.8    /  TBili  0.2    /  DBili  x      /  AST  14     /  ALT  26     /  AlkPhos  137    26 Dec 2024 06:35      Urinalysis Basic - ( 26 Dec 2024 06:35 )    Color: x / Appearance: x / SG: x / pH: x  Gluc: 115 mg/dL / Ketone: x  / Bili: x / Urobili: x   Blood: x / Protein: x / Nitrite: x   Leuk Esterase: x / RBC: x / WBC x   Sq Epi: x / Non Sq Epi: x / Bacteria: x      CAPILLARY BLOOD GLUCOSE            Culture - Tissue with Gram Stain (collected 12-24-24 @ 11:20)  Source: Tissue  Gram Stain (12-25-24 @ 06:28):    No polymorphonuclear cells seen per low power field    Few Gram Positive Cocci in Clusters seen per oil power field  Preliminary Report (12-25-24 @ 18:17):    Moderate Staphylococcus aureus    Culture - Acid Fast - Tissue w/Smear (collected 12-24-24 @ 11:20)  Source: Tissue  Preliminary Report (12-25-24 @ 23:07):    Culture is being performed.    Culture - Tissue with Gram Stain (collected 12-24-24 @ 11:17)  Source: Tissue  Gram Stain (12-25-24 @ 06:34):    No polymorphonuclear cells seen per low power field    No organisms seen per oil power field  Preliminary Report (12-25-24 @ 18:41):    No growth    Culture - Wound Aerobic/Anaerobic (collected 12-24-24 @ 11:14)  Source: .Surgical Swab  Preliminary Report (12-25-24 @ 15:33):    Few Staphylococcus aureus    Culture - Wound Aerobic/Anaerobic (collected 12-24-24 @ 11:08)  Source: .Surgical Swab  Preliminary Report (12-25-24 @ 15:36):    Few Staphylococcus aureus    Culture - Blood (collected 12-22-24 @ 11:45)  Source: .Blood BLOOD  Preliminary Report (12-25-24 @ 17:00):    No growth at 72 Hours    Culture - Blood (collected 12-22-24 @ 11:43)  Source: .Blood BLOOD  Preliminary Report (12-25-24 @ 17:00):    No growth at 72 Hours    Culture - Blood (collected 12-20-24 @ 08:53)  Source: .Blood BLOOD  Final Report (12-25-24 @ 13:00):    No growth at 5 days    Culture - Blood (collected 12-20-24 @ 08:50)  Source: .Blood BLOOD  Gram Stain (12-23-24 @ 23:42):    Growth in anaerobic bottle: Gram Positive Cocci in Clusters  Final Report (12-24-24 @ 18:19):    Growth in anaerobic bottle: Staphylococcus aureus    See previous culture [id number]        RADIOLOGY & ADDITIONAL TESTS:    Personally reviewed.     Consultant(s) Notes Reviewed:  [x] YES  [ ] NO

## 2024-12-26 NOTE — PROGRESS NOTE ADULT - ASSESSMENT
54-year-old female who presents to the emergency department here at [facility] complaining of left foot and ankle pain.  Patient was seen here 3 days PTA complaining of atraumatic left foot pain with no history of gout able to weight-bear but pain worsened with weightbearing.      RECOMMENDATIONS  MSSA Bacteremia, septic joint and osteomyelitis with SEPSIS pt meetings SIRS criteria with  Body temperature over 100.4°F (38°C) or under 96.8°F (36°C), Heart rate greater than 90 beats per minute, and  White blood cell count greater than 12,000 per µL (12 × 10^9 per L), with concern for infectious . Only obv localization is left ankle but findings seem minimal relative to septic picture. Potential for left ankle osteomyelitis or septic arthritis but as discussed with podiatry would be very challenging technically to try to obtain fluid for cell count and micro  rec:  RAD: 12/20/2024  (electronically signed. Dec 20 2024  3:43PM) 3 phase bone scan left ankle/foot-Abnormal combined Indium-111 labeled leukocyte study and marrow scan. Incongruent uptake in the left talus and navicular bone compatible with osteomyelitis/septic arthritis.  Culture - Blood (12.17.24 @ 16:15) -  Methicillin SENSITIVE Staphylococcus aureus (MSSA)  Culture - Blood (12.18.24 @ 22:20) - Growth in anaerobic bottle: Gram Positive Cocci in Clusters  Culture - Blood (12.20.24 @ 08:53)--No growth at > 48 Hours  Culture - Blood (12.22.24 05:00 am) - NGTD  Incision and drainage, bone abscess or osteomyelitis, foot 24-Dec-2024 11:45:42  Herbert Fatima  Arthrotomy of left ankle 24-Dec-2024 11:46:04  Herbert Fatima  Surgical removal of navicular bone of foot 24-Dec-2024 11:46:25  Herbert Fatima  TTE- 12/21/2024 unremarkable for IE  changed abx to Cefepime 2 grams IV q12 (started 12/18) and then rec    Cefazolin 2 grams IV q8 started 12/19 with recommended 6 weeks of IV Rx from date of surgery with last day 2/3/25  weekly labs CBC,w diff, CMP faxed to [phone]  fine to place PICC    Thank you for consulting us and involving us in the management of this most interesting and challenging case.  We will follow along in the care of this patient. Please call us at [phone] or text me directly on my cell# at [phone] with any concerns.

## 2024-12-26 NOTE — PROGRESS NOTE ADULT - SUBJECTIVE AND OBJECTIVE BOX
[facility] DIVISION of INFECTIOUS DISEASE  Goyo Reinoso MD PhD, Laurie Guerrero MD, Tahira Mills MD, Mary Barreto MD, Ronn Ann MD  and providing coverage with Roger Anderson MD  Providing Infectious Disease Consultations at [facility], [facility], [facility], [facility]'s    Office# [phone] to schedule follow up appointments  Answering Service for urgent calls or New Consults [phone]  Cell# to text for urgent issues Goyo Reinoso [phone]     infectious diseases progress note:    CHANCE AMBROSE is a 54y y. o. Female patient    Overnight and events of the last 24hrs reviewed    Allergies    No Known Allergies    Intolerances        ANTIBIOTICS/RELEVANT:  antimicrobials  ceFAZolin   IVPB 2000 milliGRAM(s) IV Intermittent every 8 hours    immunologic:  influenza   Vaccine 0.5 milliLiter(s) IntraMuscular once    OTHER:  acetaminophen     Tablet .. 650 milliGRAM(s) Oral every 6 hours PRN  calcium carbonate    500 mG (Tums) Chewable 1 Tablet(s) Chew every 6 hours PRN  enoxaparin Injectable 40 milliGRAM(s) SubCutaneous every 24 hours  lactulose Syrup 20 Gram(s) Oral daily PRN  oxycodone    5 mG/acetaminophen 325 mG 1 Tablet(s) Oral every 6 hours PRN  oxycodone    5 mG/acetaminophen 325 mG 0.5 Tablet(s) Oral every 6 hours PRN  pantoprazole   Suspension 40 milliGRAM(s) Oral daily  polyethylene glycol 3350 17 Gram(s) Oral two times a day  senna 2 Tablet(s) Oral at bedtime  sodium chloride 0.9%. 1000 milliLiter(s) IV Continuous <Continuous>      Objective:  Vital Signs Last 24 Hrs  T(C): 36.8 (26 Dec 2024 04:47), Max: 36.8 (25 Dec 2024 20:41)  T(F): 98.2 (26 Dec 2024 04:47), Max: 98.2 (25 Dec 2024 20:41)  HR: 95 (26 Dec 2024 04:47) (93 - 95)  BP: 123/83 (26 Dec 2024 04:47) (104/70 - 123/83)  BP(mean): --  RR: 18 (26 Dec 2024 04:47) (18 - 18)  SpO2: 94% (26 Dec 2024 04:47) (94% - 99%)    Parameters below as of 26 Dec 2024 04:47  Patient On (Oxygen Delivery Method): room air        T(C): 36.8 (12-26-24 @ 04:47), Max: 37 (12-25-24 @ 05:01)  T(C): 36.8 (12-26-24 @ 04:47), Max: 37.1 (12-24-24 @ 05:29)  T(C): 36.8 (12-26-24 @ 04:47), Max: 37.3 (12-23-24 @ 04:53)    PHYSICAL EXAM:  HEENT: NC atraumatic  Neck: supple  Respiratory: no accessory muscle use, breathing comfortably  Cardiovascular: distant  Gastrointestinal: normal appearing, nondistended  Extremities: no clubbing, no cyanosis,        LABS:                          12.7   15.37 )-----------( 660      ( 26 Dec 2024 09:47 )             38.9       WBC  15.37 12-26 @ 09:47  14.02 12-26 @ 09:40  18.17 12-25 @ 10:15  12.40 12-23 @ 07:20  16.00 12-21 @ 07:35  16.93 12-20 @ 08:23      12-26    135  |  99  |  13  ----------------------------<  115[H]  4.2   |  29  |  0.71    Ca    10.8[H]      26 Dec 2024 06:35    TPro  8.2  /  Alb  2.8[L]  /  TBili  0.2  /  DBili  x   /  AST  14[L]  /  ALT  26  /  AlkPhos  137[H]  12-26      Creatinine: 0.71 mg/dL (12-26-24 @ 06:35)  Creatinine: 0.69 mg/dL (12-25-24 @ 10:15)  Creatinine: 0.64 mg/dL (12-23-24 @ 07:20)  Creatinine: 0.62 mg/dL (12-21-24 @ 07:35)  Creatinine: 0.55 mg/dL (12-20-24 @ 08:23)        Urinalysis Basic - ( 26 Dec 2024 06:35 )    Color: x / Appearance: x / SG: x / pH: x  Gluc: 115 mg/dL / Ketone: x  / Bili: x / Urobili: x   Blood: x / Protein: x / Nitrite: x   Leuk Esterase: x / RBC: x / WBC x   Sq Epi: x / Non Sq Epi: x / Bacteria: x            INFLAMMATORY MARKERS      MICROBIOLOGY:              RADIOLOGY & ADDITIONAL STUDIES:

## 2024-12-26 NOTE — PROGRESS NOTE ADULT - TIME BILLING
direct patient care including but not limited to reviewing chart, medications ,laboratory data, imaging reports, discussion of plan of care with consultants on the case, coordination of care with multidisciplinary team involved in the case and discussion of plan with patient.  Patient and family agreeable to plan of care and verbalized understanding the anticipated hospital course and treatment plan.
direct patient care including but not limited to reviewing chart, medications ,laboratory data, imaging reports, discussion of plan of care with consultants on the case, coordination of care with multidisciplinary team involved in the case and discussion of plan with patient who is agreeable to plan of care and verbalized understanding the anticipated hospital course and treatment plan.      Time spent excludes teaching and separately reported services.
direct patient care including but not limited to reviewing chart, medications ,laboratory data, imaging reports, discussion of plan of care with consultants on the case, coordination of care with multidisciplinary team involved in the case and discussion of plan with patient.  Patient and family agreeable to plan of care and verbalized understanding the anticipated hospital course and treatment plan.

## 2024-12-26 NOTE — DISCHARGE NOTE NURSING/CASE MANAGEMENT/SOCIAL WORK - NSDCPEFALRISK_GEN_ALL_CORE
For information on Fall & Injury Prevention, visit: [email or web address] OR  [email or web address] OR  https://www.cdc.gov/steadi/patient.html

## 2024-12-26 NOTE — PROGRESS NOTE ADULT - PROBLEM SELECTOR PLAN 1
Discussed diagnosis and treatment with patient.  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  Area of concern responding favorably to current treatment plan  POS #2 s/p  I&D of foot ankle joint and TN joint with debridement of all non viable soft tissue and bone with deep soft tissue and bone cultures over packing all left foot.  Removed all packings today without incident   Appreciates ID consult. Picc line placed  Rec continued elevation of left lower extremity.  Medical management as per Primary Team  Patient will follow up with Dr. Herbert Fatima 2-3 days after discharge. Please call [phone] for any question  Dressings instruction : please keep it clean/dry/intact   Will discuss with all attendings.

## 2024-12-26 NOTE — PROGRESS NOTE ADULT - ASSESSMENT
54-year-old female who presents to the emergency department here at [facility] complaining of left foot pain.  Patient was seen here 3 days ago complaining of atraumatic left foot pain with no history of gout able to weight-bear but pain worsened with weightbearing.  No fever no chills no nausea no vomiting no diarrhea no trauma      1.  Sepsis sec to Left foot infection with lactic acidosis POA,  MSSA bacteremia   - cw abx as per ID , now on cefepime - Ancef   - MRI: Soft tissue infection at the ankle and foot as above with devitalized or   ischemic soft tissue most prominent between the os navicularis and medial   talus.  Signal abnormality within the os naviculare and adjacent navicular which   could be related to osseous naviculare syndrome or osteomyelitis.  Ankle joint effusion most prominent at the anterolateral gutter with   signal abnormality within the distal fibula and adjacent talus which   could be related to septic arthritis or degenerative change.    -  indium scan : Abnormal combined Indium-111 labeled leukocyte study and marrow scan. Incongruent uptake in the left talus and navicular bone compatible with osteomyelitis/septic arthritis.  - TTE: normal EF, trace valvular disease    - pain control: tylenol , percocet   -  repeat  blood cultures 12/20  NTD , blood culture 12/22 NTD    - pain management: prn percocet   - plan for PICC today   - podiatry I&D to left ankle on 12/24 tissue Gram stain 1/2  G +  cocci  - bone pathology pending   -  Abx IV ancef to 2/3/25   Constipation : add lactulose , enema, miralax increased to BID     Lovenox for DVT prophylaxis

## 2024-12-26 NOTE — DISCHARGE NOTE NURSING/CASE MANAGEMENT/SOCIAL WORK - FINANCIAL ASSISTANCE
[facility] provides services at a reduced cost to those who are determined to be eligible through [facility]’s financial assistance program. Information regarding [facility]’s financial assistance program can be found by going to [email or web address] or by calling [phone].

## 2024-12-26 NOTE — DISCHARGE NOTE NURSING/CASE MANAGEMENT/SOCIAL WORK - PATIENT PORTAL LINK FT
You can access the FollowMyHealth Patient Portal offered by [facility] by registering at the following website: [email or web address]. By joining [facility]’s FollowMyHealth portal, you will also be able to view your health information using other applications (apps) compatible with our system.

## 2024-12-26 NOTE — PROGRESS NOTE ADULT - SUBJECTIVE AND OBJECTIVE BOX
PROGRESS NOTE   Patient is a 54y old  Female who presents with a chief complaint of Foot pain (26 Dec 2024 13:56)      HPI:  54-year-old female who presents to the emergency department here at [facility] complaining of left foot pain.  Patient was seen here 3 days ago complaining of atraumatic left foot pain with no history of gout able to weight-bear but pain worsened with weightbearing.  No fever no chills no nausea no vomiting no diarrhea no trauma (17 Dec 2024 18:17)      Vital Signs Last 24 Hrs  T(C): 36.4 (26 Dec 2024 11:26), Max: 36.8 (25 Dec 2024 20:41)  T(F): 97.5 (26 Dec 2024 11:26), Max: 98.2 (25 Dec 2024 20:41)  HR: 96 (26 Dec 2024 11:26) (93 - 96)  BP: 127/88 (26 Dec 2024 11:26) (104/70 - 127/88)  BP(mean): --  RR: 18 (26 Dec 2024 11:26) (18 - 18)  SpO2: 96% (26 Dec 2024 11:26) (94% - 96%)    Parameters below as of 26 Dec 2024 11:26  Patient On (Oxygen Delivery Method): room air                              12.7   15.37 )-----------( 660      ( 26 Dec 2024 09:47 )             38.9               12-26    135  |  99  |  13  ----------------------------<  115[H]  4.2   |  29  |  0.71    Ca    10.8[H]      26 Dec 2024 06:35    TPro  8.2  /  Alb  2.8[L]  /  TBili  0.2  /  DBili  x   /  AST  14[L]  /  ALT  26  /  AlkPhos  137[H]  12-26      PHYSICAL EXAM  General: NAD & AOX3.     Integument:  Skin warm, dry and supple bilateral.     s/p  I&D of foot ankle joint and TN joint with debridement of all non viable soft tissue and bone with deep soft tissue and bone cultures over packing all left foot. Excision of os tibiale externum.   Vascular: Dorsalis Pedis and Posterior Tibial pulses 2/4.  Capillary re-fill time less then 3 seconds digits 1-5 bilateral.  reduced edema on left foot   Neuro: Protective sensation intact to the level of the digits bilateral.   MSK: Muscle strength 5/5 all major muscle groups bilateral. Reduced pain on palpation to the medial aspect of left foot     PROGRESS NOTE   Patient is a 54y old  Female who presents with a chief complaint of Foot pain (26 Dec 2024 13:56)      HPI:  54-year-old female who presents to the emergency department here at [facility] complaining of left foot pain.  Patient was seen here 3 days ago complaining of atraumatic left foot pain with no history of gout able to weight-bear but pain worsened with weightbearing.  No fever no chills no nausea no vomiting no diarrhea no trauma (17 Dec 2024 18:17)      Vital Signs Last 24 Hrs  T(C): 36.4 (26 Dec 2024 11:26), Max: 36.8 (25 Dec 2024 20:41)  T(F): 97.5 (26 Dec 2024 11:26), Max: 98.2 (25 Dec 2024 20:41)  HR: 96 (26 Dec 2024 11:26) (93 - 96)  BP: 127/88 (26 Dec 2024 11:26) (104/70 - 127/88)  BP(mean): --  RR: 18 (26 Dec 2024 11:26) (18 - 18)  SpO2: 96% (26 Dec 2024 11:26) (94% - 96%)    Parameters below as of 26 Dec 2024 11:26  Patient On (Oxygen Delivery Method): room air                              12.7   15.37 )-----------( 660      ( 26 Dec 2024 09:47 )             38.9               12-26    135  |  99  |  13  ----------------------------<  115[H]  4.2   |  29  |  0.71    Ca    10.8[H]      26 Dec 2024 06:35    TPro  8.2  /  Alb  2.8[L]  /  TBili  0.2  /  DBili  x   /  AST  14[L]  /  ALT  26  /  AlkPhos  137[H]  12-26      PHYSICAL EXAM  General: NAD & AOX3.     Integument:  Skin warm, dry and supple bilateral.     s/p  I&D of foot ankle joint and TN joint with debridement of all non viable soft tissue and bone with deep soft tissue and bone cultures over packing all left foot. Excision of os tibiale externum.  The region of concern is responding favorable to the current care plan   Vascular: Dorsalis Pedis and Posterior Tibial pulses 2/4.  Capillary re-fill time less then 3 seconds digits 1-5 bilateral.  reduced edema on left foot   Neuro: Protective sensation intact to the level of the digits bilateral.   MSK: Muscle strength 5/5 all major muscle groups bilateral. Reduced pain on palpation to the medial aspect of left foot

## 2024-12-26 NOTE — DISCHARGE NOTE NURSING/CASE MANAGEMENT/SOCIAL WORK - NSDCCRNUMBER_GEN_ALL_CORE_FT
[facility] care agency [phone] or [phone] will reach out to you within 24-72 hours of your discharge to schedule home care visit/eval appointment with you. Please call agency for any queries regarding home care services

## 2024-12-27 VITALS
DIASTOLIC BLOOD PRESSURE: 69 MMHG | SYSTOLIC BLOOD PRESSURE: 109 MMHG | HEART RATE: 88 BPM | OXYGEN SATURATION: 97 % | RESPIRATION RATE: 18 BRPM | TEMPERATURE: 98 F

## 2024-12-27 LAB
-  CLINDAMYCIN: SIGNIFICANT CHANGE UP
-  ERYTHROMYCIN: SIGNIFICANT CHANGE UP
-  GENTAMICIN: SIGNIFICANT CHANGE UP
-  OXACILLIN: SIGNIFICANT CHANGE UP
-  RIFAMPIN: SIGNIFICANT CHANGE UP
-  TETRACYCLINE: SIGNIFICANT CHANGE UP
-  TRIMETHOPRIM/SULFAMETHOXAZOLE: SIGNIFICANT CHANGE UP
-  VANCOMYCIN: SIGNIFICANT CHANGE UP
CULTURE RESULTS: SIGNIFICANT CHANGE UP
CULTURE RESULTS: SIGNIFICANT CHANGE UP
METHOD TYPE: SIGNIFICANT CHANGE UP
SPECIMEN SOURCE: SIGNIFICANT CHANGE UP
SPECIMEN SOURCE: SIGNIFICANT CHANGE UP
SURGICAL PATHOLOGY STUDY: SIGNIFICANT CHANGE UP

## 2024-12-27 PROCEDURE — 36415 COLL VENOUS BLD VENIPUNCTURE: CPT

## 2024-12-27 PROCEDURE — 80048 BASIC METABOLIC PNL TOTAL CA: CPT

## 2024-12-27 PROCEDURE — 71045 X-RAY EXAM CHEST 1 VIEW: CPT

## 2024-12-27 PROCEDURE — 87070 CULTURE OTHR SPECIMN AEROBIC: CPT

## 2024-12-27 PROCEDURE — C1751: CPT

## 2024-12-27 PROCEDURE — 87075 CULTR BACTERIA EXCEPT BLOOD: CPT

## 2024-12-27 PROCEDURE — 96365 THER/PROPH/DIAG IV INF INIT: CPT

## 2024-12-27 PROCEDURE — 96375 TX/PRO/DX INJ NEW DRUG ADDON: CPT

## 2024-12-27 PROCEDURE — 87640 STAPH A DNA AMP PROBE: CPT

## 2024-12-27 PROCEDURE — 87040 BLOOD CULTURE FOR BACTERIA: CPT

## 2024-12-27 PROCEDURE — A9579: CPT

## 2024-12-27 PROCEDURE — 87150 DNA/RNA AMPLIFIED PROBE: CPT

## 2024-12-27 PROCEDURE — 87206 SMEAR FLUORESCENT/ACID STAI: CPT

## 2024-12-27 PROCEDURE — 93005 ELECTROCARDIOGRAM TRACING: CPT

## 2024-12-27 PROCEDURE — 97162 PT EVAL MOD COMPLEX 30 MIN: CPT

## 2024-12-27 PROCEDURE — 73720 MRI LWR EXTREMITY W/O&W/DYE: CPT | Mod: MC

## 2024-12-27 PROCEDURE — 36573 INSJ PICC RS&I 5 YR+: CPT

## 2024-12-27 PROCEDURE — 93306 TTE W/DOPPLER COMPLETE: CPT

## 2024-12-27 PROCEDURE — 88311 DECALCIFY TISSUE: CPT

## 2024-12-27 PROCEDURE — 87641 MR-STAPH DNA AMP PROBE: CPT

## 2024-12-27 PROCEDURE — 84702 CHORIONIC GONADOTROPIN TEST: CPT

## 2024-12-27 PROCEDURE — 87015 SPECIMEN INFECT AGNT CONCNTJ: CPT

## 2024-12-27 PROCEDURE — 73630 X-RAY EXAM OF FOOT: CPT

## 2024-12-27 PROCEDURE — 99285 EMERGENCY DEPT VISIT HI MDM: CPT

## 2024-12-27 PROCEDURE — 85652 RBC SED RATE AUTOMATED: CPT

## 2024-12-27 PROCEDURE — 83605 ASSAY OF LACTIC ACID: CPT

## 2024-12-27 PROCEDURE — 85610 PROTHROMBIN TIME: CPT

## 2024-12-27 PROCEDURE — A9541: CPT

## 2024-12-27 PROCEDURE — 84550 ASSAY OF BLOOD/URIC ACID: CPT

## 2024-12-27 PROCEDURE — A9570: CPT

## 2024-12-27 PROCEDURE — 97164 PT RE-EVAL EST PLAN CARE: CPT

## 2024-12-27 PROCEDURE — 86140 C-REACTIVE PROTEIN: CPT

## 2024-12-27 PROCEDURE — 80053 COMPREHEN METABOLIC PANEL: CPT

## 2024-12-27 PROCEDURE — 78102 BONE MARROW IMAGING LTD: CPT

## 2024-12-27 PROCEDURE — 78800 RP LOCLZJ TUM 1 AREA 1 D IMG: CPT | Mod: MC

## 2024-12-27 PROCEDURE — 88304 TISSUE EXAM BY PATHOLOGIST: CPT

## 2024-12-27 PROCEDURE — 87116 MYCOBACTERIA CULTURE: CPT

## 2024-12-27 PROCEDURE — 81025 URINE PREGNANCY TEST: CPT

## 2024-12-27 PROCEDURE — 85730 THROMBOPLASTIN TIME PARTIAL: CPT

## 2024-12-27 PROCEDURE — 87186 SC STD MICRODIL/AGAR DIL: CPT

## 2024-12-27 PROCEDURE — 76937 US GUIDE VASCULAR ACCESS: CPT

## 2024-12-27 PROCEDURE — 85025 COMPLETE CBC W/AUTO DIFF WBC: CPT

## 2024-12-27 PROCEDURE — 85027 COMPLETE CBC AUTOMATED: CPT

## 2024-12-27 PROCEDURE — 99239 HOSP IP/OBS DSCHRG MGMT >30: CPT

## 2024-12-27 RX ORDER — SENNOSIDES 8.6 MG/1
2 TABLET, FILM COATED ORAL
Qty: 60 | Refills: 0
Start: 2024-12-27 | End: 2025-01-25

## 2024-12-27 RX ORDER — PANTOPRAZOLE 40 MG/1
1 TABLET, DELAYED RELEASE ORAL
Qty: 30 | Refills: 0
Start: 2024-12-27 | End: 2025-01-25

## 2024-12-27 RX ORDER — POLYETHYLENE GLYCOL 3350 17 G/DOSE
17 POWDER (GRAM) ORAL
Qty: 1020 | Refills: 0
Start: 2024-12-27 | End: 2025-01-25

## 2024-12-27 RX ADMIN — ACETAMINOPHEN 650 MILLIGRAM(S): 80 SOLUTION/ DROPS ORAL at 01:29

## 2024-12-27 RX ADMIN — ACETAMINOPHEN 650 MILLIGRAM(S): 80 SOLUTION/ DROPS ORAL at 12:09

## 2024-12-27 RX ADMIN — CHLORHEXIDINE GLUCONATE 1 APPLICATION(S): 1.2 RINSE ORAL at 05:45

## 2024-12-27 RX ADMIN — Medication 100 MILLIGRAM(S): at 05:45

## 2024-12-27 NOTE — PROGRESS NOTE ADULT - REASON FOR ADMISSION
Foot pain
Foot pain left
Foot pain left foot
Foot pain

## 2024-12-27 NOTE — PROGRESS NOTE ADULT - PROVIDER SPECIALTY LIST ADULT
Infectious Disease
Infectious Disease
Podiatry
Hospitalist
Infectious Disease
Infectious Disease
Podiatry
Infectious Disease
Podiatry
Podiatry
Hospitalist
Podiatry
Hospitalist
Podiatry
Podiatry

## 2024-12-27 NOTE — PROGRESS NOTE ADULT - SUBJECTIVE AND OBJECTIVE BOX
[facility] DIVISION of INFECTIOUS DISEASE  Goyo Reinoso MD PhD, Laurie Guerrero MD, Tahira Mills MD, Mary Barreto MD, Ronn Ann MD  and providing coverage with Roger Anderson MD  Providing Infectious Disease Consultations at [facility], [facility], [facility], [facility]'s    Office# [phone] to schedule follow up appointments  Answering Service for urgent calls or New Consults [phone]  Cell# to text for urgent issues Goyo Reinoso [phone]     infectious diseases progress note:    CHANCE AMBROSE is a 54y y. o. Female patient    Overnight and events of the last 24hrs reviewed    Allergies    No Known Allergies    Intolerances        ANTIBIOTICS/RELEVANT:  antimicrobials  ceFAZolin   IVPB 2000 milliGRAM(s) IV Intermittent every 8 hours    immunologic:  influenza   Vaccine 0.5 milliLiter(s) IntraMuscular once    OTHER:  acetaminophen     Tablet .. 650 milliGRAM(s) Oral every 6 hours PRN  calcium carbonate    500 mG (Tums) Chewable 1 Tablet(s) Chew every 6 hours PRN  chlorhexidine 4% Liquid 1 Application(s) Topical <User Schedule>  enoxaparin Injectable 40 milliGRAM(s) SubCutaneous every 24 hours  lactulose Syrup 20 Gram(s) Oral daily PRN  oxycodone    5 mG/acetaminophen 325 mG 1 Tablet(s) Oral every 6 hours PRN  oxycodone    5 mG/acetaminophen 325 mG 0.5 Tablet(s) Oral every 6 hours PRN  pantoprazole   Suspension 40 milliGRAM(s) Oral daily  polyethylene glycol 3350 17 Gram(s) Oral two times a day  senna 2 Tablet(s) Oral at bedtime  sodium chloride 0.9% lock flush 10 milliLiter(s) IV Push every 1 hour PRN  sodium chloride 0.9%. 1000 milliLiter(s) IV Continuous <Continuous>      Objective:  Vital Signs Last 24 Hrs  T(C): 36.9 (27 Dec 2024 04:36), Max: 37.1 (26 Dec 2024 20:10)  T(F): 98.5 (27 Dec 2024 04:36), Max: 98.7 (26 Dec 2024 20:10)  HR: 89 (27 Dec 2024 04:36) (89 - 96)  BP: 116/81 (27 Dec 2024 04:36) (102/70 - 127/88)  BP(mean): --  RR: 18 (27 Dec 2024 04:36) (18 - 18)  SpO2: 95% (27 Dec 2024 04:36) (95% - 96%)    Parameters below as of 27 Dec 2024 04:36  Patient On (Oxygen Delivery Method): room air        T(C): 36.9 (12-27-24 @ 04:36), Max: 37.1 (12-26-24 @ 20:10)  T(C): 36.9 (12-27-24 @ 04:36), Max: 37.1 (12-26-24 @ 20:10)  T(C): 36.9 (12-27-24 @ 04:36), Max: 37.1 (12-24-24 @ 05:29)    PHYSICAL EXAM:  HEENT: NC atraumatic  Neck: supple  Respiratory: no accessory muscle use, breathing comfortably  Cardiovascular: distant  Gastrointestinal: normal appearing, nondistended  Extremities: no clubbing, no cyanosis,        LABS:                          12.7   15.37 )-----------( 660      ( 26 Dec 2024 09:47 )             38.9       WBC  15.37 12-26 @ 09:47  14.02 12-26 @ 09:40  18.17 12-25 @ 10:15  12.40 12-23 @ 07:20  16.00 12-21 @ 07:35      12-26    135  |  99  |  13  ----------------------------<  115[H]  4.2   |  29  |  0.71    Ca    10.8[H]      26 Dec 2024 06:35    TPro  8.2  /  Alb  2.8[L]  /  TBili  0.2  /  DBili  x   /  AST  14[L]  /  ALT  26  /  AlkPhos  137[H]  12-26      Creatinine: 0.71 mg/dL (12-26-24 @ 06:35)  Creatinine: 0.69 mg/dL (12-25-24 @ 10:15)  Creatinine: 0.64 mg/dL (12-23-24 @ 07:20)  Creatinine: 0.62 mg/dL (12-21-24 @ 07:35)        Urinalysis Basic - ( 26 Dec 2024 06:35 )    Color: x / Appearance: x / SG: x / pH: x  Gluc: 115 mg/dL / Ketone: x  / Bili: x / Urobili: x   Blood: x / Protein: x / Nitrite: x   Leuk Esterase: x / RBC: x / WBC x   Sq Epi: x / Non Sq Epi: x / Bacteria: x            INFLAMMATORY MARKERS      MICROBIOLOGY:              RADIOLOGY & ADDITIONAL STUDIES:

## 2024-12-27 NOTE — PROGRESS NOTE ADULT - SUBJECTIVE AND OBJECTIVE BOX
PROGRESS NOTE   Patient is a 54y old  Female who presents with a chief complaint of Foot pain (27 Dec 2024 09:35)      HPI:  54-year-old female who presents to the emergency department here at [facility] complaining of left foot pain.  Patient was seen here 3 days ago complaining of atraumatic left foot pain with no history of gout able to weight-bear but pain worsened with weightbearing.  No fever no chills no nausea no vomiting no diarrhea no trauma (17 Dec 2024 18:17)      Vital Signs Last 24 Hrs  T(C): 36.9 (27 Dec 2024 04:36), Max: 37.1 (26 Dec 2024 20:10)  T(F): 98.5 (27 Dec 2024 04:36), Max: 98.7 (26 Dec 2024 20:10)  HR: 89 (27 Dec 2024 04:36) (89 - 96)  BP: 116/81 (27 Dec 2024 04:36) (102/70 - 127/88)  BP(mean): --  RR: 18 (27 Dec 2024 04:36) (18 - 18)  SpO2: 95% (27 Dec 2024 04:36) (95% - 96%)    Parameters below as of 27 Dec 2024 04:36  Patient On (Oxygen Delivery Method): room air                              12.7   15.37 )-----------( 660      ( 26 Dec 2024 09:47 )             38.9               12-26    135  |  99  |  13  ----------------------------<  115[H]  4.2   |  29  |  0.71    Ca    10.8[H]      26 Dec 2024 06:35    TPro  8.2  /  Alb  2.8[L]  /  TBili  0.2  /  DBili  x   /  AST  14[L]  /  ALT  26  /  AlkPhos  137[H]  12-26      PHYSICAL EXAM  General: NAD & AOX3.     Integument:  Skin warm, dry and supple bilateral.     s/p  I&D of foot ankle joint and TN joint with debridement of all non viable soft tissue and bone with deep soft tissue and bone cultures over packing all left foot. Excision of os tibiale externum.  The region of concern is responding favorable to the current care plan   Vascular: Dorsalis Pedis and Posterior Tibial pulses 2/4.  Capillary re-fill time less then 3 seconds digits 1-5 bilateral.  reduced edema on left foot   Neuro: Protective sensation intact to the level of the digits bilateral.   MSK: Muscle strength 5/5 all major muscle groups bilateral. Reduced pain on palpation to the medial aspect of left foot

## 2024-12-27 NOTE — PROGRESS NOTE ADULT - ASSESSMENT
54-year-old female who presents to the emergency department here at [facility] complaining of left foot and ankle pain.  Patient was seen here 3 days PTA complaining of atraumatic left foot pain with no history of gout able to weight-bear but pain worsened with weightbearing.      RECOMMENDATIONS  MSSA Bacteremia, septic joint and osteomyelitis with SEPSIS pt meetings SIRS criteria with  Body temperature over 100.4°F (38°C) or under 96.8°F (36°C), Heart rate greater than 90 beats per minute, and  White blood cell count greater than 12,000 per µL (12 × 10^9 per L), with concern for infectious . Only obv localization is left ankle but findings seem minimal relative to septic picture. Potential for left ankle osteomyelitis or septic arthritis but as discussed with podiatry would be very challenging technically to try to obtain fluid for cell count and micro  rec:  RAD: 12/20/2024  (electronically signed. Dec 20 2024  3:43PM) 3 phase bone scan left ankle/foot-Abnormal combined Indium-111 labeled leukocyte study and marrow scan. Incongruent uptake in the left talus and navicular bone compatible with osteomyelitis/septic arthritis.  Culture - Blood (12.17.24 @ 16:15) -  Methicillin SENSITIVE Staphylococcus aureus (MSSA)  Culture - Blood (12.18.24 @ 22:20) - Growth in anaerobic bottle: Gram Positive Cocci in Clusters  Culture - Blood (12.20.24 @ 08:53)--No growth at > 48 Hours  Culture - Blood (12.22.24 05:00 am) - NGTD  Incision and drainage, bone abscess or osteomyelitis, foot 24-Dec-2024 11:45:42  Herbert Fatima  Arthrotomy of left ankle 24-Dec-2024 11:46:04  Herbert Fatima  Surgical removal of navicular bone of foot 24-Dec-2024 11:46:25  Herbert Fatima  TTE- 12/21/2024 unremarkable for IE  changed abx to Cefepime 2 grams IV q12 (started 12/18) and then rec    Cefazolin 2 grams IV q8 started 12/19 with recommended 6 weeks of IV Rx from date of surgery with last day 2/3/25  weekly labs CBC w diff, CMP faxed to [phone]  PICC placed-removal at end of Rx  orders called into [facility]    From an ID standpoint no further requirement for inpatient status for the management of ID issues. Fine with discharge from ID standpoint when other medical issues no longer require inpatient care and social issues allow for a safe discharge plan. To schedule an outpatient ID follow up appointment please call our office at [phone]      From an ID standpoint no further requirement for inpatient status for the management of ID issues. Fine with discharge from ID standpoint when other medical issues no longer require inpatient care and social issues allow for a safe discharge plan. To schedule an outpatient ID follow up appointment please call our office at [phone]

## 2024-12-27 NOTE — PROGRESS NOTE ADULT - PROBLEM SELECTOR PLAN 1
Discussed diagnosis and treatment with patient.  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  Area of concern responding favorably to current treatment plan  POS #3 s/p I&D of foot ankle joint and TN joint with debridement of all non viable soft tissue and bone with deep soft tissue and bone cultures over packing all left foot.  Appreciates ID consult. Picc line placed  Rec continued elevation of left lower extremity.  Medical management as per Primary Team  Patient will follow up with Dr. Herbert Fatima 2-3 days after discharge. Please call [phone] for any question  Dressings instruction : please keep it clean/dry/intact   Will discuss with all attendings.

## 2024-12-27 NOTE — PROGRESS NOTE ADULT - ATTENDING COMMENTS
reviewed medical history, physical exam and care plan   note read and reviewed
reviewed medical history, physical exam and care plan  note read and modified

## 2024-12-27 NOTE — CASE MANAGEMENT PROGRESS NOTE - NSCMPROGRESSNOTE_GEN_ALL_CORE
Per MD, patient is medically cleared for discharge home today.  CM met with patient at bedside to discussed discharge disposition is home with [facility] for IV infusion, [facility] care and a new RW. Discharge notice signed and copy given to patient.  RW was delivered to bedside by [employer] [phone]  representative.  to transport patient home. Patient verbalized understanding of the transition plan and is in agreement. CM answered all questions to the best of my abilities. CM remains available throughout hospital stay.

## 2024-12-27 NOTE — PROGRESS NOTE ADULT - PROBLEM SELECTOR PROBLEM 1
Cellulitis of left foot

## 2024-12-29 LAB
CULTURE RESULTS: ABNORMAL
ORGANISM # SPEC MICROSCOPIC CNT: ABNORMAL
ORGANISM # SPEC MICROSCOPIC CNT: SIGNIFICANT CHANGE UP
SPECIMEN SOURCE: SIGNIFICANT CHANGE UP

## (undated) DEVICE — DRSG CURITY GAUZE SPONGE 4 X 4" 12-PLY

## (undated) DEVICE — GLV 7.5 PROTEXIS (CREAM) NEU-THERA

## (undated) DEVICE — DRSG ACE BANDAGE 4"

## (undated) DEVICE — PLV/PSP-TOURNIQUET #2 3006KAAL: Type: DURABLE MEDICAL EQUIPMENT

## (undated) DEVICE — STAPLER COVIDIEN SKIN APPOSE 35

## (undated) DEVICE — TOURNIQUET ESMARK 4"

## (undated) DEVICE — SOL IRR POUR NS 0.9% 1000ML

## (undated) DEVICE — VENODYNE/SCD SLEEVE CALF LARGE

## (undated) DEVICE — PLV/PSP-ESU VALLEYLAB T7E14761DX: Type: DURABLE MEDICAL EQUIPMENT

## (undated) DEVICE — VENODYNE/SCD SLEEVE CALF MEDIUM

## (undated) DEVICE — WARMING BLANKET FULL ADULT

## (undated) DEVICE — DRSG WEBRIL 4"

## (undated) DEVICE — PLV-SCD MACHINE: Type: DURABLE MEDICAL EQUIPMENT

## (undated) DEVICE — DRSG ADAPTIC 3 X 3"

## (undated) DEVICE — BLADE SURGICAL #15 CARBON

## (undated) DEVICE — PACK LOWER EXTREMITY NS PLAINVI

## (undated) DEVICE — SUT MONOSOF 4-0 18" P-12

## (undated) DEVICE — CATH IV SAFE INSYTE 14G X 1.75" (ORANGE)